# Patient Record
Sex: FEMALE | Race: WHITE | HISPANIC OR LATINO | Employment: FULL TIME
[De-identification: names, ages, dates, MRNs, and addresses within clinical notes are randomized per-mention and may not be internally consistent; named-entity substitution may affect disease eponyms.]

---

## 2022-01-22 ENCOUNTER — HEALTH MAINTENANCE LETTER (OUTPATIENT)
Age: 42
End: 2022-01-22

## 2022-01-26 ENCOUNTER — PRENATAL OFFICE VISIT (OUTPATIENT)
Dept: NURSING | Facility: CLINIC | Age: 42
End: 2022-01-26
Payer: COMMERCIAL

## 2022-01-26 DIAGNOSIS — Z34.80 PRENATAL CARE, SUBSEQUENT PREGNANCY, UNSPECIFIED TRIMESTER: Primary | ICD-10-CM

## 2022-01-26 PROBLEM — G89.29 CHRONIC NONINTRACTABLE HEADACHE: Status: ACTIVE | Noted: 2022-01-14

## 2022-01-26 PROBLEM — R51.9 CHRONIC NONINTRACTABLE HEADACHE: Status: ACTIVE | Noted: 2022-01-14

## 2022-01-26 PROBLEM — N93.9 ABNORMAL UTERINE BLEEDING (AUB): Status: ACTIVE | Noted: 2022-01-14

## 2022-01-26 PROCEDURE — 99207 PR NO CHARGE NURSE ONLY: CPT

## 2022-01-26 RX ORDER — VITAMIN A ACETATE, .BETA.-CAROTENE, ASCORBIC ACID, CHOLECALCIFEROL, .ALPHA.-TOCOPHEROL ACETATE, DL-, THIAMINE MONONITRATE, RIBOFLAVIN, NIACINAMIDE, PYRIDOXINE HYDROCHLORIDE, FOLIC ACID, CYANOCOBALAMIN, CALCIUM CARBONATE, FERROUS FUMARATE, ZINC OXIDE, AND CUPRIC OXIDE 2000; 2000; 120; 400; 22; 1.84; 3; 20; 10; 1; 12; 200; 27; 25; 2 [IU]/1; [IU]/1; MG/1; [IU]/1; MG/1; MG/1; MG/1; MG/1; MG/1; MG/1; UG/1; MG/1; MG/1; MG/1; MG/1
1 TABLET ORAL DAILY
COMMUNITY
End: 2022-03-02

## 2022-01-26 NOTE — PROGRESS NOTES
NPN nurse visit done over the phone. Pt will be given NPN folder and book at her upcoming appt.   Discussed optional screening available to assess chromosomal anomalies. Questions answered. Pt advised to call the clinic if she has any questions or concerns related to her pregnancy. Prenatal labs will be obtained at her upcoming appt. New prenatal visit scheduled on 2/7/22 with Dr Soliz.    14w3d    No results found for: PAP        Patient supplied answers from flow sheet for:  Prenatal OB Questionnaire.  Past Medical History  Have you ever recieved care for your mental health? : No  Have you ever been in a major accident or suffered serious trauma?: No  Within the last year, has anyone hit, slapped, kicked or otherwise hurt you?: No  In the last year, has anyone forced you to have sex when you didn't want to?: No    Past Medical History 2   Have you ever received a blood transfusion?: No  Would you accept a blood transfusion if was medically recommended?: Yes  Does anyone in your home smoke?: No   Is your blood type Rh negative?: No  Have you ever ?: (!) Yes  Have you been hospitalized for a nonsurgical reason excluding normal delivery?: No  Have you ever had an abnormal pap smear?: No    Past Medical History (Continued)  Do you have a history of abnormalities of the uterus?: No  Did your mother take ADA or any other hormones when she was pregnant with you?: No  Do you have any other problems we have not asked about which you feel may be important to this pregnancy?: No

## 2022-02-01 ENCOUNTER — LAB (OUTPATIENT)
Dept: LAB | Facility: CLINIC | Age: 42
End: 2022-02-01

## 2022-02-01 ENCOUNTER — ANCILLARY PROCEDURE (OUTPATIENT)
Dept: ULTRASOUND IMAGING | Facility: CLINIC | Age: 42
End: 2022-02-01
Payer: COMMERCIAL

## 2022-02-01 DIAGNOSIS — Z34.80 PRENATAL CARE, SUBSEQUENT PREGNANCY, UNSPECIFIED TRIMESTER: ICD-10-CM

## 2022-02-01 LAB
ABO/RH(D): NORMAL
ANTIBODY SCREEN: NEGATIVE
ERYTHROCYTE [DISTWIDTH] IN BLOOD BY AUTOMATED COUNT: 13.7 % (ref 10–15)
HCT VFR BLD AUTO: 32.7 % (ref 35–47)
HGB BLD-MCNC: 10.9 G/DL (ref 11.7–15.7)
MCH RBC QN AUTO: 28.8 PG (ref 26.5–33)
MCHC RBC AUTO-ENTMCNC: 33.3 G/DL (ref 31.5–36.5)
MCV RBC AUTO: 86 FL (ref 78–100)
PLATELET # BLD AUTO: 109 10E3/UL (ref 150–450)
RBC # BLD AUTO: 3.79 10E6/UL (ref 3.8–5.2)
SPECIMEN EXPIRATION DATE: NORMAL
WBC # BLD AUTO: 5.9 10E3/UL (ref 4–11)

## 2022-02-01 PROCEDURE — 86803 HEPATITIS C AB TEST: CPT

## 2022-02-01 PROCEDURE — 86850 RBC ANTIBODY SCREEN: CPT

## 2022-02-01 PROCEDURE — 87086 URINE CULTURE/COLONY COUNT: CPT

## 2022-02-01 PROCEDURE — 86901 BLOOD TYPING SEROLOGIC RH(D): CPT

## 2022-02-01 PROCEDURE — 86900 BLOOD TYPING SEROLOGIC ABO: CPT

## 2022-02-01 PROCEDURE — 87389 HIV-1 AG W/HIV-1&-2 AB AG IA: CPT

## 2022-02-01 PROCEDURE — 86762 RUBELLA ANTIBODY: CPT

## 2022-02-01 PROCEDURE — 36415 COLL VENOUS BLD VENIPUNCTURE: CPT

## 2022-02-01 PROCEDURE — 85027 COMPLETE CBC AUTOMATED: CPT

## 2022-02-01 PROCEDURE — 87340 HEPATITIS B SURFACE AG IA: CPT

## 2022-02-01 PROCEDURE — 86780 TREPONEMA PALLIDUM: CPT

## 2022-02-01 PROCEDURE — 76805 OB US >/= 14 WKS SNGL FETUS: CPT | Performed by: OBSTETRICS & GYNECOLOGY

## 2022-02-02 LAB
BACTERIA UR CULT: NO GROWTH
HBV SURFACE AG SERPL QL IA: NONREACTIVE
HCV AB SERPL QL IA: NONREACTIVE
HIV 1+2 AB+HIV1 P24 AG SERPL QL IA: NONREACTIVE
RUBV IGG SERPL QL IA: 26.4 INDEX
RUBV IGG SERPL QL IA: POSITIVE
T PALLIDUM AB SER QL: NONREACTIVE

## 2022-02-04 DIAGNOSIS — O09.529 SUPERVISION OF HIGH-RISK PREGNANCY OF ELDERLY MULTIGRAVIDA: Primary | ICD-10-CM

## 2022-02-04 NOTE — RESULT ENCOUNTER NOTE
Please call with normal prenatal labs apart from mildly low hgb and low platelets. I'd like her to obtain a repeat CBC in 1 week with ferritin and TIBC. If persistent low plt, will plan hematology referral for evaluation, though it may be a spuriously low result due to type of tubing used for draw or another lab error, which is why a repeat is helpful.    Natalie Soliz MD

## 2022-02-07 ENCOUNTER — TRANSCRIBE ORDERS (OUTPATIENT)
Dept: MATERNAL FETAL MEDICINE | Facility: CLINIC | Age: 42
End: 2022-02-07

## 2022-02-07 ENCOUNTER — PRENATAL OFFICE VISIT (OUTPATIENT)
Dept: OBGYN | Facility: CLINIC | Age: 42
End: 2022-02-07
Payer: COMMERCIAL

## 2022-02-07 VITALS
HEIGHT: 61 IN | WEIGHT: 151 LBS | DIASTOLIC BLOOD PRESSURE: 50 MMHG | BODY MASS INDEX: 28.51 KG/M2 | SYSTOLIC BLOOD PRESSURE: 100 MMHG

## 2022-02-07 DIAGNOSIS — Z12.4 SCREENING FOR CERVICAL CANCER: ICD-10-CM

## 2022-02-07 DIAGNOSIS — O09.529 SUPERVISION OF HIGH-RISK PREGNANCY OF ELDERLY MULTIGRAVIDA: ICD-10-CM

## 2022-02-07 DIAGNOSIS — Z11.3 ROUTINE SCREENING FOR STI (SEXUALLY TRANSMITTED INFECTION): ICD-10-CM

## 2022-02-07 DIAGNOSIS — O26.90 PREGNANCY RELATED CONDITION: Primary | ICD-10-CM

## 2022-02-07 DIAGNOSIS — O09.522 MULTIGRAVIDA OF ADVANCED MATERNAL AGE IN SECOND TRIMESTER: Primary | ICD-10-CM

## 2022-02-07 LAB
ERYTHROCYTE [DISTWIDTH] IN BLOOD BY AUTOMATED COUNT: 13.9 % (ref 10–15)
HCT VFR BLD AUTO: 35 % (ref 35–47)
HGB BLD-MCNC: 11.5 G/DL (ref 11.7–15.7)
MCH RBC QN AUTO: 28.7 PG (ref 26.5–33)
MCHC RBC AUTO-ENTMCNC: 32.9 G/DL (ref 31.5–36.5)
MCV RBC AUTO: 87 FL (ref 78–100)
PLATELET # BLD AUTO: 138 10E3/UL (ref 150–450)
RBC # BLD AUTO: 4.01 10E6/UL (ref 3.8–5.2)
WBC # BLD AUTO: 7.1 10E3/UL (ref 4–11)

## 2022-02-07 PROCEDURE — 87624 HPV HI-RISK TYP POOLED RSLT: CPT | Performed by: OBSTETRICS & GYNECOLOGY

## 2022-02-07 PROCEDURE — 87491 CHLMYD TRACH DNA AMP PROBE: CPT | Performed by: OBSTETRICS & GYNECOLOGY

## 2022-02-07 PROCEDURE — 99207 PR FIRST OB VISIT: CPT | Performed by: OBSTETRICS & GYNECOLOGY

## 2022-02-07 PROCEDURE — 82728 ASSAY OF FERRITIN: CPT

## 2022-02-07 PROCEDURE — G0145 SCR C/V CYTO,THINLAYER,RESCR: HCPCS | Performed by: OBSTETRICS & GYNECOLOGY

## 2022-02-07 PROCEDURE — 36415 COLL VENOUS BLD VENIPUNCTURE: CPT | Performed by: OBSTETRICS & GYNECOLOGY

## 2022-02-07 PROCEDURE — 87591 N.GONORRHOEAE DNA AMP PROB: CPT | Performed by: OBSTETRICS & GYNECOLOGY

## 2022-02-07 PROCEDURE — 85027 COMPLETE CBC AUTOMATED: CPT | Performed by: OBSTETRICS & GYNECOLOGY

## 2022-02-07 PROCEDURE — 83550 IRON BINDING TEST: CPT

## 2022-02-07 RX ORDER — PNV NO.95/FERROUS FUM/FOLIC AC 28MG-0.8MG
1 TABLET ORAL DAILY
Qty: 90 TABLET | Refills: 3 | Status: SHIPPED | OUTPATIENT
Start: 2022-02-07

## 2022-02-07 ASSESSMENT — MIFFLIN-ST. JEOR: SCORE: 1293.34

## 2022-02-07 NOTE — NURSING NOTE
"Chief Complaint   Patient presents with     Prenatal Care     NPN Provider visit   16w1d  Discuss lab results    initial /50   Ht 1.543 m (5' 0.75\")   Wt 68.5 kg (151 lb)   LMP 10/17/2021   BMI 28.77 kg/m   Estimated body mass index is 28.77 kg/m  as calculated from the following:    Height as of this encounter: 1.543 m (5' 0.75\").    Weight as of this encounter: 68.5 kg (151 lb).  BP completed using cuff size regular.  Fadia Carroll CMA    "

## 2022-02-07 NOTE — PROGRESS NOTES
New OB Visit  Allison Henao   2022   16w1d     Subjective: Allison Henao 41 year old  at 16w1d dated by LMP, midtrimester ultrasound here today for initial OB visit. Estimated Date of Delivery: 2022 Patient reports No Problems. Did has some mild cramps and scant vaginal spotting.     Faroese speaking patient, seen with  present virtually.    Gyn History:   Menses: LMP: Patient's last menstrual period was 10/17/2021. irregular, was using natural family planning  Sexually transmitted disease history: none.    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   H/o Chicken Pox or Varicella Vaccination: Yes  Covid Vax: x2, needs booster    History of GDM: No  Hx PTL : No   History of HTN in pregnancy: No   Shoulder dystocia: No   Vacuum Extraction: No  PPH: No   3rd of 4th degree laceration: No. She did have an episiotomy with the first.   Other complications: No    Since her last LMP she denies use of alcohol, tobacco and street drugs.    OBhx:  x 2  OB History    Para Term  AB Living   4 2 2 0 1 2   SAB IAB Ectopic Multiple Live Births   1 0 0 0 2      # Outcome Date GA Lbr John/2nd Weight Sex Delivery Anes PTL Lv   4 Current            3 SAB 2017           2 Term  38w0d   M Vag-Spont   ROMINA      Name: Rajesh   1 Term  40w0d   F Vag-Spont   ROMINA      Name: Yessy       ROS: Ten point review of systems was reviewed and negative except the above.    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, factory   Tobacco Use     Smoking status: Never Smoker     Smokeless tobacco: Never Used   Vaping Use     Vaping Use: Never used   Substance and Sexual Activity     Alcohol use: Not Currently     Drug use: Never     Sexual activity: Yes     Partners: Male   Other Topics Concern     None   Social History Narrative     None     Social Determinants of Health     Financial Resource Strain: Not on file   Food Insecurity: Not on file   Transportation Needs: Not on file   Physical Activity: Not on file   Stress: Not on file   Social Connections: Not on file   Intimate Partner Violence: Not on file   Housing Stability: Not on file     Current Outpatient Medications   Medication Sig     Prenatal Vit-Fe Fumarate-FA (PNV PRENATAL PLUS MULTIVITAMIN) 27-1 MG TABS per tablet Take 1 tablet by mouth daily     No current facility-administered medications for this visit.     No Known Allergies    Past medical, surgical, social and family history were reviewed and updated in EPIC.      EXAM:  /50   Ht 1.543 m (5' 0.75\")   Wt 68.5 kg (151 lb)   LMP 10/17/2021   BMI 28.77 kg/m       Gen:  no acute distress, comfortable  HENT: No scleral injection or icterus  CV: Regular rate and rhythm, no murmur  Resp: CTAB, Normal work of breathing, no cough  GI: Abdomen soft, non-tender. No masses, organomegaly  Skin: No suspicious lesions or rashes  Psychiatric: mentation appears normal and affect bright   Pelvis: External genitalia within normal limits. Urethra is without lesion. Bladder is nontender.    On speculum exam, cervix is without lesion and vagina is normal without lesion or discharge. Pap smear obtained without incident.   +      Recent Labs   Lab Test 02/01/22  1053   AS Negative   O+  Rhogam not indicated     Recent Labs   Lab Test 02/01/22  1053   HEPBANG Nonreactive   HIAGAB Nonreactive   RUQIGG Positive*       Treponemal antibody neg    CBC RESULTS:   Recent " Labs   Lab Test 22  1053   WBC 5.9   RBC 3.79*   HGB 10.9*   HCT 32.7*   MCV 86   MCH 28.8   MCHC 33.3   RDW 13.7   *       ASSESSMENT:  41 year old  at 16w1d dated by LMP and mid trimester US here for NOB visit.    PLAN:    1)Prenatal labs reviewed. She has no questions.  2) EDUCATION : RECOMMENDED WEIGHT GAIN: 15-25 lbs given Body mass index is 28.77 kg/m .   - Instructed on best evidence for: healthy diet and foods to avoid; exercise and activity during pregnancy; and maintenance of a generally healthy lifestyle. Reviewed early pregnancy education, provider coverage, labor and delivery, and prenatal visits.  Discussed the harms, benefits, side effects and alternative therapies for current prescribed and OTC medications.  - recommend PNV  3) Discussed options for screening for chromosomal anomalies, including first screen, noninvasive prenatal testing, CVS/amniocentesis, quad screen, and ultrasound at 18-20 weeks. She is electing ultrasound at 18-20 weeks. Undecided on genetic screening at this point.   4) AMA: level II,  testing at 36w    5) Thrombocytopenia: repeat CBC, if persistently low, plan Hematology referral.    5) Mild anemia: repeat hgb and iron studies, follow up as necessary    Follow up in 4 weeks. She is encouraged to call sooner with questions or concerns.    Natalie Soliz MD   Obstetrics and Gynecology

## 2022-02-08 ENCOUNTER — TELEPHONE (OUTPATIENT)
Dept: OBGYN | Facility: CLINIC | Age: 42
End: 2022-02-08
Payer: COMMERCIAL

## 2022-02-08 LAB
C TRACH DNA SPEC QL NAA+PROBE: NEGATIVE
FERRITIN SERPL-MCNC: 10 NG/ML (ref 12–150)
IRON SATN MFR SERPL: 13 % (ref 15–46)
IRON SERPL-MCNC: 65 UG/DL (ref 35–180)
N GONORRHOEA DNA SPEC QL NAA+PROBE: NEGATIVE
TIBC SERPL-MCNC: 512 UG/DL (ref 240–430)

## 2022-02-08 NOTE — TELEPHONE ENCOUNTER
M referral placed yesterday for first trimester screen with genetic counseling and US. Patient is too far along in pregnancy for first trimester screen.    [facility] is calling pt to offer a Comprehensive US and genetic screen.    China Fong CMA

## 2022-02-10 ENCOUNTER — TELEPHONE (OUTPATIENT)
Dept: OBGYN | Facility: CLINIC | Age: 42
End: 2022-02-10
Payer: COMMERCIAL

## 2022-02-10 NOTE — TELEPHONE ENCOUNTER
Form received from: Spouse- Juan Browndo     Form requesting following info/need: FMLA Forms for Spouse of pt from [facility]     MALCOLM needed?: NA     Location of form: Basket Above Lavonne's desk     When completed the route for return: Call pt's spouse to  form at [phone]

## 2022-02-11 LAB
BKR LAB AP GYN ADEQUACY: NORMAL
BKR LAB AP GYN INTERPRETATION: NORMAL
BKR LAB AP HPV REFLEX: NORMAL
BKR LAB AP PREVIOUS ABNORMAL: NORMAL
PATH REPORT.COMMENTS IMP SPEC: NORMAL
PATH REPORT.COMMENTS IMP SPEC: NORMAL
PATH REPORT.RELEVANT HX SPEC: NORMAL

## 2022-02-14 LAB
HUMAN PAPILLOMA VIRUS 16 DNA: NEGATIVE
HUMAN PAPILLOMA VIRUS 18 DNA: NEGATIVE
HUMAN PAPILLOMA VIRUS FINAL DIAGNOSIS: NORMAL
HUMAN PAPILLOMA VIRUS OTHER HR: NEGATIVE

## 2022-02-17 NOTE — TELEPHONE ENCOUNTER
Forms signed by AB, and patient notified, forms placed up front for pick-up.  Janelle Ramsey CMA

## 2022-02-17 NOTE — TELEPHONE ENCOUNTER
Dr. oSliz is in clinic today and will sign the forms. She has been out of the clinic and then very busy while in clinic.   Janelle Ramsey CMA

## 2022-02-22 ENCOUNTER — PRE VISIT (OUTPATIENT)
Dept: MATERNAL FETAL MEDICINE | Facility: CLINIC | Age: 42
End: 2022-02-22
Payer: COMMERCIAL

## 2022-03-01 ENCOUNTER — HOSPITAL ENCOUNTER (OUTPATIENT)
Dept: ULTRASOUND IMAGING | Facility: CLINIC | Age: 42
End: 2022-03-01
Attending: OBSTETRICS & GYNECOLOGY
Payer: COMMERCIAL

## 2022-03-01 ENCOUNTER — OFFICE VISIT (OUTPATIENT)
Dept: MATERNAL FETAL MEDICINE | Facility: CLINIC | Age: 42
End: 2022-03-01
Attending: OBSTETRICS & GYNECOLOGY
Payer: COMMERCIAL

## 2022-03-01 DIAGNOSIS — O09.522 AMA (ADVANCED MATERNAL AGE) MULTIGRAVIDA 35+, SECOND TRIMESTER: Primary | ICD-10-CM

## 2022-03-01 DIAGNOSIS — O26.90 PREGNANCY RELATED CONDITION: ICD-10-CM

## 2022-03-01 DIAGNOSIS — O43.119 CIRCUMVALLATE PLACENTA, UNSPECIFIED TRIMESTER: ICD-10-CM

## 2022-03-01 PROCEDURE — 76811 OB US DETAILED SNGL FETUS: CPT | Mod: 26 | Performed by: OBSTETRICS & GYNECOLOGY

## 2022-03-01 PROCEDURE — 76811 OB US DETAILED SNGL FETUS: CPT

## 2022-03-02 ENCOUNTER — PRENATAL OFFICE VISIT (OUTPATIENT)
Dept: OBGYN | Facility: CLINIC | Age: 42
End: 2022-03-02
Payer: COMMERCIAL

## 2022-03-02 VITALS — DIASTOLIC BLOOD PRESSURE: 54 MMHG | BODY MASS INDEX: 29.87 KG/M2 | SYSTOLIC BLOOD PRESSURE: 106 MMHG | WEIGHT: 156.8 LBS

## 2022-03-02 DIAGNOSIS — O09.529 SUPERVISION OF HIGH-RISK PREGNANCY OF ELDERLY MULTIGRAVIDA: Primary | ICD-10-CM

## 2022-03-02 DIAGNOSIS — N89.8 VAGINAL DISCHARGE: ICD-10-CM

## 2022-03-02 DIAGNOSIS — O43.119 ANTEPARTUM PLACENTA CIRCUMVALLATA: ICD-10-CM

## 2022-03-02 DIAGNOSIS — O99.019 ANTEPARTUM ANEMIA: ICD-10-CM

## 2022-03-02 LAB
CLUE CELLS: ABNORMAL
TRICHOMONAS, WET PREP: ABNORMAL
WBC'S/HIGH POWER FIELD, WET PREP: ABNORMAL
YEAST, WET PREP: ABNORMAL

## 2022-03-02 PROCEDURE — 99213 OFFICE O/P EST LOW 20 MIN: CPT | Performed by: OBSTETRICS & GYNECOLOGY

## 2022-03-02 PROCEDURE — 87210 SMEAR WET MOUNT SALINE/INK: CPT | Performed by: OBSTETRICS & GYNECOLOGY

## 2022-03-02 PROCEDURE — 99207 PR PRENATAL VISIT: CPT | Performed by: OBSTETRICS & GYNECOLOGY

## 2022-03-02 NOTE — NURSING NOTE
"Chief Complaint   Patient presents with     Prenatal Care     19 weeks 3 days, no c/o VB or cramping.      Vaginal Discharge     increased discharge and itching since .        Initial /54   Wt 71.1 kg (156 lb 12.8 oz)   LMP 10/17/2021   BMI 29.87 kg/m   Estimated body mass index is 29.87 kg/m  as calculated from the following:    Height as of 22: 1.543 m (5' 0.75\").    Weight as of this encounter: 71.1 kg (156 lb 12.8 oz).  BP completed using cuff size: regular    Questioned patient about current smoking habits.  Pt. has never smoked.          The following HM Due: NONE    Janelle Ramsey CMA                "

## 2022-03-02 NOTE — PROGRESS NOTES
IUP at 19w3d,    Late entry to prenatal care  +FM, no cramping or bleeding  Today reports white vaginal discharge and pruritis.     PE  /54   Wt 71.1 kg (156 lb 12.8 oz)   LMP 10/17/2021   BMI 29.87 kg/m     : External genitalia normal well-estrogenized, healthy tissue.  No obvious excoriations, lesions, or rashes. Bartholins, urethra, skeins normal.  Normal pink vaginal mucosa.  SSE: Normal cervix with scant thick, curd-like white discharge, pH 4.    1. Supervision of high-risk pregnancy of elderly multigravida  Level II within normal limits with the exception of circumvallate placenta, see below.    2. Antepartum anemia  - mild anemia, on PO iron x2w. Plan to repeat CBC in 2 week  - mild thrombocytopenia - if persistently low on repeat check, plan Heme consult    3. Antepartum placenta circumvallata  - serial growth US    4. Vaginal discharge  - hx pruritis w/new onset white discharge. Suspect yeast by hx and exam. Wet prep sent. Reviewed diflucan if indicated by wet prep. No cramping.     Return to clinic 4w for routine prenatal care.     Natalie Soliz MD

## 2022-03-15 DIAGNOSIS — D69.6 THROMBOCYTOPENIA AFFECTING PREGNANCY (H): Primary | ICD-10-CM

## 2022-03-15 DIAGNOSIS — O99.119 THROMBOCYTOPENIA AFFECTING PREGNANCY (H): Primary | ICD-10-CM

## 2022-03-16 ENCOUNTER — PATIENT OUTREACH (OUTPATIENT)
Dept: ONCOLOGY | Facility: CLINIC | Age: 42
End: 2022-03-16
Payer: COMMERCIAL

## 2022-03-17 NOTE — PROGRESS NOTES
RECORDS STATUS - ALL OTHER DIAGNOSIS      RECORDS RECEIVED FROM: [facility]   DATE RECEIVED: 3/28/2022   NOTES STATUS DETAILS   OFFICE NOTE from referring provider     DISCHARGE REPORT from the ER     MEDICATION LIST Complete [facility]   CLINICAL TRIAL TREATMENTS TO DATE     LABS     PATHOLOGY REPORTS     ANYTHING RELATED TO DIAGNOSIS COmplete Labs last updated on 3/10/2022   GENONOMIC TESTING     TYPE:     IMAGING (NEED IMAGES & REPORT)     CT SCANS     MRI     MAMMO     ULTRASOUND Complete US OB images are in PACS   PET       .

## 2022-03-27 NOTE — PROGRESS NOTES
[facility]    Hematology/Oncology New Patient Note      Today's Date: 3/28/22    Reason for Consultation: Thrombocytopenia affecting pregnancy (H)  Referring Provider: Natalie Soliz MD      HISTORY OF PRESENT ILLNESS: Allison Henao is a  42 year old female who is referred for anemia and thrombocytopenia in pregnancy. She is currently 23 weeks gestation. Past medical history is significant for ovarian cyst, prediabetes, HLD. She is Bangladeshi-speaking and history is obtained via virtual .    [place]. She has delivered her two children in [place] without CBC abnormality, both alive and well. Both were delivered vaginally without excessive bleed. [place] she has had one miscarriage in 2017 at 6 weeks gestation. She had vaginal bleeding for 10 days.    Since , WBC has ranged 5.9-8.4, hemoglobin has decreased from 12.8 to 10.7 (MCV 80s), platelets have ranged 109-178. On 22, ferritin 10, TIBC 512, iron sat 13%. HIV negative. Hep B surface antigen negative. She takes iron and prenatal vitamin.    No epistaxis, gingival bleed, petechiae, hematuria/hematochezia/melena, or vaginal bleed. LMP was 2021. Planned for vaginal delivery 22.    She denies malar rash. She has intermittent arthralgias, but in the shoulders. 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    REVIEW OF SYSTEMS:   A 14 point ROS was reviewed with pertinent positives and negatives in the HPI.        HOME MEDICATIONS:  Current Outpatient Medications   Medication Sig Dispense Refill     Prenatal Vit-Fe Fumarate-FA (PRENATAL VITAMIN) 27-0.8 MG TABS Take 1 tablet by mouth daily 90 tablet 3         ALLERGIES:  No Known Allergies      PAST MEDICAL HISTORY:  Past Medical History:   Diagnosis Date     Ovarian cyst          PAST SURGICAL HISTORY:  Past Surgical History:   Procedure Laterality Date     ovarian cystectomy  2018  "        SOCIAL HISTORY:  Social History     Socioeconomic History     Marital status:      Spouse name: Not on file     Number of children: Not on file     Years of education: Not on file     Highest education level: Not on file   Occupational History     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, factory   Tobacco Use     Smoking status: Never Smoker     Smokeless tobacco: Never Used   Vaping Use     Vaping Use: Never used   Substance and Sexual Activity     Alcohol use: Not Currently     Drug use: Never     Sexual activity: Yes     Partners: Male   Other Topics Concern     Not on file   Social History Narrative     Not on file     Social Determinants of Health     Financial Resource Strain: Not on file   Food Insecurity: Not on file   Transportation Needs: Not on file   Physical Activity: Not on file   Stress: Not on file   Social Connections: Not on file   Intimate Partner Violence: Not on file   Housing Stability: Not on file         FAMILY HISTORY:  Family History   Problem Relation Age of Onset     Thyroid Disease Mother      Pacemaker Mother      Pacemaker Father    Sister- DM2.  No rheumatologic disorders.       PHYSICAL EXAM:  Vital signs:  BP 96/60   Pulse 78   Temp 97.6  F (36.4  C) (Tympanic)   Resp 16   Ht 1.543 m (5' 0.75\")   Wt 73.5 kg (162 lb)   LMP 10/17/2021   SpO2 97%   BMI 30.86 kg/m       GENERAL/CONSTITUTIONAL: No acute distress.  EYES: Pupils are equal, round, and react to light and accommodation. Extraocular movements intact.  No scleral icterus.  ENT/MOUTH: Neck supple. Oropharynx clear, no mucositis.  LYMPH: No anterior cervical, posterior cervical, supraclavicular, axillary or inguinal adenopathy.   RESPIRATORY: Clear to auscultation bilaterally. No crackles or wheezing.   CARDIOVASCULAR: Regular rate and rhythm without murmurs, gallops, or rubs.  GASTROINTESTINAL: No hepatosplenomegaly, masses, or tenderness. The patient has normal bowel sounds. No guarding.  No distention. Gravid " abdomen.  MUSCULOSKELETAL: Warm and well-perfused, no cyanosis, clubbing, or edema.  NEUROLOGIC: Cranial nerves II-XII are intact. Alert, oriented, answers questions appropriately.  INTEGUMENTARY: No rashes or jaundice.  GAIT: Steady, does not use assistive device      LABS:  CBC RESULTS:   Recent Labs   Lab Test 03/10/22  1659   WBC 7.6   RBC 3.66*   HGB 10.7*   HCT 32.6*   MCV 89   MCH 29.2   MCHC 32.8   RDW 14.6   *       Recent Labs   Lab Test 09/28/15  1902      POTASSIUM 3.6   CHLORIDE 105   CO2 24   ANIONGAP 8   GLC 96   BUN 10   CR 0.52   DENITA 8.7     [facility] BLOOD  [facility]  2020    Component 2020         WBC 5.7 4.9 5.7   RBC 4.43 4.62 4.59   Hemoglobin 12.4 12.8 12.8   HCT 37.4 39.2 38.8   MCV 84.4 84.8 84.5   MCH 28.0 27.7 27.9   MCHC 33.2 32.7 33.0   RDW 13.2 13.3 12.9   Platelets 157 153 137 Low       PMN/Band -- -- --   Lymph -- -- --   Mono -- -- --   Eos -- -- --   Baso -- -- --   Neutrophil Absolute 3.4 3.0 4.3   Lymph Absolute -- -- --   Mono  Absolute -- -- --   Eos   Absolute -- -- --   Baso  Absolute -- -- --   MPV -- -- --   Lymphocyte Absolute 1.8 1.4 1.0   Monocytes Absolute 0.4 0.3 0.3   Eosinophil Absolute 0.1 0.1 0.1   Basophil Absolute 0.0 0.0 0.0   Immature Gran % 0.0 0.0 0.0         PATHOLOGY:  N/A    IMAGING:  N/A    ASSESSMENT/PLAN:  Allison Henao is a  42 year old female who is referred for anemia and thrombocytopenia in pregnancy. She is currently 23 weeks gestation. Past medical history is significant for ovarian cyst, prediabetes, HLD. She is Sami-speaking and history is obtained via [facility] . Due date is 22.    Discussed with patient her past medical history, previous pregnancies, and current laboratory findings. She has evidence of iron deficiency anemia due to pregnancy and will supplement with venofer 300 mg weekly x3 weeks. She should continue daily iron, VitC, and prenatal vitamin.     For  thrombocytopenia, differential includes gestational thrombocytopenia as platelets continue to be >100K. However, we discussed the possibility of ITP and need to monitor for HELLP/DIC. We discussed the possibility of steroid pulse or IVIG in the future.     Check CBC, CMP, coag studies, LDH, haptoglobin, folate, B12, LEEANN, and peripheral smear. She will require weekly labs for monitoring.     Thank you referring Mrs. Henao to clinic. Adequate time was dedicated to patient questions and answered to the expressed satisfaction of the patient.    Complexity: HIGH.      Zari Roque,   Hematology/Oncology  [facility]

## 2022-03-28 ENCOUNTER — ONCOLOGY VISIT (OUTPATIENT)
Dept: ONCOLOGY | Facility: CLINIC | Age: 42
End: 2022-03-28
Attending: OBSTETRICS & GYNECOLOGY
Payer: COMMERCIAL

## 2022-03-28 ENCOUNTER — PRE VISIT (OUTPATIENT)
Dept: ONCOLOGY | Facility: CLINIC | Age: 42
End: 2022-03-28

## 2022-03-28 VITALS
DIASTOLIC BLOOD PRESSURE: 60 MMHG | HEART RATE: 78 BPM | BODY MASS INDEX: 30.58 KG/M2 | RESPIRATION RATE: 16 BRPM | HEIGHT: 61 IN | WEIGHT: 162 LBS | TEMPERATURE: 97.6 F | OXYGEN SATURATION: 97 % | SYSTOLIC BLOOD PRESSURE: 96 MMHG

## 2022-03-28 DIAGNOSIS — D69.6 THROMBOCYTOPENIA AFFECTING PREGNANCY (H): ICD-10-CM

## 2022-03-28 DIAGNOSIS — D50.0 IRON DEFICIENCY ANEMIA DUE TO CHRONIC BLOOD LOSS: ICD-10-CM

## 2022-03-28 DIAGNOSIS — D69.6 THROMBOCYTOPENIA AFFECTING PREGNANCY (H): Primary | ICD-10-CM

## 2022-03-28 DIAGNOSIS — O99.119 THROMBOCYTOPENIA AFFECTING PREGNANCY (H): ICD-10-CM

## 2022-03-28 DIAGNOSIS — O99.119 THROMBOCYTOPENIA AFFECTING PREGNANCY (H): Primary | ICD-10-CM

## 2022-03-28 LAB
ALBUMIN SERPL-MCNC: 2.7 G/DL (ref 3.4–5)
ALP SERPL-CCNC: 86 U/L (ref 40–150)
ALT SERPL W P-5'-P-CCNC: 27 U/L (ref 0–50)
ANION GAP SERPL CALCULATED.3IONS-SCNC: 4 MMOL/L (ref 3–14)
APTT PPP: 39 SECONDS (ref 22–38)
AST SERPL W P-5'-P-CCNC: 20 U/L (ref 0–45)
BASOPHILS # BLD AUTO: 0 10E3/UL (ref 0–0.2)
BASOPHILS NFR BLD AUTO: 0 %
BILIRUB SERPL-MCNC: 0.8 MG/DL (ref 0.2–1.3)
BUN SERPL-MCNC: 9 MG/DL (ref 7–30)
CALCIUM SERPL-MCNC: 8.7 MG/DL (ref 8.5–10.1)
CHLORIDE BLD-SCNC: 109 MMOL/L (ref 94–109)
CO2 SERPL-SCNC: 24 MMOL/L (ref 20–32)
CREAT SERPL-MCNC: 0.41 MG/DL (ref 0.52–1.04)
EOSINOPHIL # BLD AUTO: 0 10E3/UL (ref 0–0.7)
EOSINOPHIL NFR BLD AUTO: 0 %
ERYTHROCYTE [DISTWIDTH] IN BLOOD BY AUTOMATED COUNT: 14.5 % (ref 10–15)
FIBRINOGEN PPP-MCNC: 434 MG/DL (ref 170–490)
FOLATE SERPL-MCNC: 36.1 NG/ML
GFR SERPL CREATININE-BSD FRML MDRD: >90 ML/MIN/1.73M2
GLUCOSE BLD-MCNC: 88 MG/DL (ref 70–99)
HCT VFR BLD AUTO: 37.8 % (ref 35–47)
HGB BLD-MCNC: 12.4 G/DL (ref 11.7–15.7)
IMM GRANULOCYTES # BLD: 0 10E3/UL
IMM GRANULOCYTES NFR BLD: 0 %
INR PPP: 1 (ref 0.85–1.15)
LDH SERPL L TO P-CCNC: 158 U/L (ref 81–234)
LYMPHOCYTES # BLD AUTO: 1.2 10E3/UL (ref 0.8–5.3)
LYMPHOCYTES NFR BLD AUTO: 16 %
MCH RBC QN AUTO: 29.4 PG (ref 26.5–33)
MCHC RBC AUTO-ENTMCNC: 32.8 G/DL (ref 31.5–36.5)
MCV RBC AUTO: 90 FL (ref 78–100)
MONOCYTES # BLD AUTO: 0.4 10E3/UL (ref 0–1.3)
MONOCYTES NFR BLD AUTO: 6 %
NEUTROPHILS # BLD AUTO: 5.7 10E3/UL (ref 1.6–8.3)
NEUTROPHILS NFR BLD AUTO: 78 %
NRBC # BLD AUTO: 0 10E3/UL
NRBC BLD AUTO-RTO: 0 /100
PLATELET # BLD AUTO: 147 10E3/UL (ref 150–450)
POTASSIUM BLD-SCNC: 3.6 MMOL/L (ref 3.4–5.3)
PROT SERPL-MCNC: 7.1 G/DL (ref 6.8–8.8)
RBC # BLD AUTO: 4.22 10E6/UL (ref 3.8–5.2)
RETICS # AUTO: 0.12 10E6/UL (ref 0.03–0.1)
RETICS/RBC NFR AUTO: 2.9 % (ref 0.5–2)
SODIUM SERPL-SCNC: 137 MMOL/L (ref 133–144)
VIT B12 SERPL-MCNC: 227 PG/ML (ref 193–986)
WBC # BLD AUTO: 7.3 10E3/UL (ref 4–11)

## 2022-03-28 PROCEDURE — 82607 VITAMIN B-12: CPT | Performed by: INTERNAL MEDICINE

## 2022-03-28 PROCEDURE — 83010 ASSAY OF HAPTOGLOBIN QUANT: CPT | Performed by: INTERNAL MEDICINE

## 2022-03-28 PROCEDURE — 85730 THROMBOPLASTIN TIME PARTIAL: CPT | Performed by: INTERNAL MEDICINE

## 2022-03-28 PROCEDURE — 36415 COLL VENOUS BLD VENIPUNCTURE: CPT | Performed by: INTERNAL MEDICINE

## 2022-03-28 PROCEDURE — 82746 ASSAY OF FOLIC ACID SERUM: CPT | Performed by: INTERNAL MEDICINE

## 2022-03-28 PROCEDURE — 99205 OFFICE O/P NEW HI 60 MIN: CPT | Performed by: INTERNAL MEDICINE

## 2022-03-28 PROCEDURE — 86038 ANTINUCLEAR ANTIBODIES: CPT | Performed by: INTERNAL MEDICINE

## 2022-03-28 PROCEDURE — 85025 COMPLETE CBC W/AUTO DIFF WBC: CPT | Performed by: INTERNAL MEDICINE

## 2022-03-28 PROCEDURE — 80053 COMPREHEN METABOLIC PANEL: CPT | Performed by: INTERNAL MEDICINE

## 2022-03-28 PROCEDURE — G0463 HOSPITAL OUTPT CLINIC VISIT: HCPCS

## 2022-03-28 PROCEDURE — 85045 AUTOMATED RETICULOCYTE COUNT: CPT | Performed by: INTERNAL MEDICINE

## 2022-03-28 PROCEDURE — 83615 LACTATE (LD) (LDH) ENZYME: CPT | Performed by: INTERNAL MEDICINE

## 2022-03-28 PROCEDURE — 85610 PROTHROMBIN TIME: CPT | Performed by: INTERNAL MEDICINE

## 2022-03-28 PROCEDURE — 85384 FIBRINOGEN ACTIVITY: CPT | Performed by: INTERNAL MEDICINE

## 2022-03-28 RX ORDER — DIPHENHYDRAMINE HYDROCHLORIDE 50 MG/ML
50 INJECTION INTRAMUSCULAR; INTRAVENOUS
Status: CANCELLED
Start: 2022-03-30

## 2022-03-28 RX ORDER — ALBUTEROL SULFATE 0.83 MG/ML
2.5 SOLUTION RESPIRATORY (INHALATION)
Status: CANCELLED | OUTPATIENT
Start: 2022-03-30

## 2022-03-28 RX ORDER — HEPARIN SODIUM,PORCINE 10 UNIT/ML
5 VIAL (ML) INTRAVENOUS
Status: CANCELLED | OUTPATIENT
Start: 2022-03-30

## 2022-03-28 RX ORDER — HEPARIN SODIUM (PORCINE) LOCK FLUSH IV SOLN 100 UNIT/ML 100 UNIT/ML
5 SOLUTION INTRAVENOUS
Status: CANCELLED | OUTPATIENT
Start: 2022-03-30

## 2022-03-28 RX ORDER — ALBUTEROL SULFATE 90 UG/1
1-2 AEROSOL, METERED RESPIRATORY (INHALATION)
Status: CANCELLED
Start: 2022-03-30

## 2022-03-28 RX ORDER — METHYLPREDNISOLONE SODIUM SUCCINATE 125 MG/2ML
125 INJECTION, POWDER, LYOPHILIZED, FOR SOLUTION INTRAMUSCULAR; INTRAVENOUS
Status: CANCELLED
Start: 2022-03-30

## 2022-03-28 RX ORDER — MEPERIDINE HYDROCHLORIDE 25 MG/ML
25 INJECTION INTRAMUSCULAR; INTRAVENOUS; SUBCUTANEOUS EVERY 30 MIN PRN
Status: CANCELLED | OUTPATIENT
Start: 2022-03-30

## 2022-03-28 RX ORDER — EPINEPHRINE 1 MG/ML
0.3 INJECTION, SOLUTION INTRAMUSCULAR; SUBCUTANEOUS EVERY 5 MIN PRN
Status: CANCELLED | OUTPATIENT
Start: 2022-03-30

## 2022-03-28 RX ORDER — NALOXONE HYDROCHLORIDE 0.4 MG/ML
0.2 INJECTION, SOLUTION INTRAMUSCULAR; INTRAVENOUS; SUBCUTANEOUS
Status: CANCELLED | OUTPATIENT
Start: 2022-03-30

## 2022-03-28 RX ORDER — FERROUS SULFATE 325(65) MG
325 TABLET, DELAYED RELEASE (ENTERIC COATED) ORAL 2 TIMES DAILY
COMMUNITY
End: 2022-05-10 | Stop reason: ALTCHOICE

## 2022-03-28 ASSESSMENT — PAIN SCALES - GENERAL: PAINLEVEL: MILD PAIN (2)

## 2022-03-28 NOTE — NURSING NOTE
"Oncology Rooming Note    March 28, 2022 9:04 AM   Allison Henao is a 42 year old female who presents for:    Chief Complaint   Patient presents with     Oncology Clinic Visit     new patient     Initial Vitals: BP 96/60   Pulse 78   Temp 97.6  F (36.4  C) (Tympanic)   Resp 16   Ht 1.543 m (5' 0.75\")   Wt 73.5 kg (162 lb)   LMP 10/17/2021   SpO2 97%   BMI 30.86 kg/m   Estimated body mass index is 30.86 kg/m  as calculated from the following:    Height as of this encounter: 1.543 m (5' 0.75\").    Weight as of this encounter: 73.5 kg (162 lb). Body surface area is 1.77 meters squared.  Mild Pain (2) Comment: Data Unavailable   Patient's last menstrual period was 10/17/2021.  Allergies reviewed: Yes  Medications reviewed: Yes    Medications: Medication refills not needed today.  Pharmacy name entered into [employer]: [id number] - [facility] [id number] AT [place] [address]    Clinical concerns: new patient       Sana Busch CMA              "

## 2022-03-28 NOTE — PROGRESS NOTES
Medical Assistant Note:  Allison Henao presents today for blood draw.    Patient seen by provider today: Yes: Dr. Roque.   present during visit today: Not Applicable.    Concerns: No Concerns.    Procedure:  Labs drawn    Post Assessment:  Labs drawn without difficulty: Yes.    Discharge Plan:  Departure Mode: Ambulatory.    Face to Face Time: 10 min.    Renetta Calzada CMA

## 2022-03-28 NOTE — LETTER
3/28/2022         RE: Allison Henao  [address] [facility] [address]        Dear Colleague,    Thank you for referring your patient, Allison Henao, to the [facility] [place]. Please see a copy of my visit note below.    [facility]    Hematology/Oncology New Patient Note      Today's Date: 3/28/22    Reason for Consultation: Thrombocytopenia affecting pregnancy (H)  Referring Provider: Natalie Soliz MD      HISTORY OF PRESENT ILLNESS: Allison Henao is a  42 year old female who is referred for anemia and thrombocytopenia in pregnancy. She is currently 23 weeks gestation. Past medical history is significant for ovarian cyst, prediabetes, HLD. She is British-speaking and history is obtained via virtual .    She is originally from [facility] and [place] the [place] in [place]. She has delivered her two children in [facility] without CBC abnormality, both alive and well. Both were delivered vaginally without excessive bleed. [place] has had one miscarriage in 2017 at 6 weeks gestation. She had vaginal bleeding for 10 days.    Since , WBC has ranged 5.9-8.4, hemoglobin has decreased from 12.8 to 10.7 (MCV 80s), platelets have ranged 109-178. On 22, ferritin 10, TIBC 512, iron sat 13%. HIV negative. Hep B surface antigen negative. She takes iron and prenatal vitamin.    No epistaxis, gingival bleed, petechiae, hematuria/hematochezia/melena, or vaginal bleed. LMP was 2021. Planned for vaginal delivery 22.    She denies malar rash. She has intermittent arthralgias, but in the shoulders. 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    REVIEW OF SYSTEMS:   A 14 point ROS was reviewed with pertinent positives and negatives in the HPI.        HOME MEDICATIONS:  Current Outpatient Medications   Medication Sig Dispense Refill     Prenatal Vit-Fe Fumarate-FA (PRENATAL VITAMIN) 27-0.8 MG TABS Take 1 tablet by  "mouth daily 90 tablet 3         ALLERGIES:  No Known Allergies      PAST MEDICAL HISTORY:  Past Medical History:   Diagnosis Date     Ovarian cyst          PAST SURGICAL HISTORY:  Past Surgical History:   Procedure Laterality Date     ovarian cystectomy  09/21/2018         SOCIAL HISTORY:  Social History     Socioeconomic History     Marital status:      Spouse name: Not on file     Number of children: Not on file     Years of education: Not on file     Highest education level: Not on file   Occupational History     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, factory   Tobacco Use     Smoking status: Never Smoker     Smokeless tobacco: Never Used   Vaping Use     Vaping Use: Never used   Substance and Sexual Activity     Alcohol use: Not Currently     Drug use: Never     Sexual activity: Yes     Partners: Male   Other Topics Concern     Not on file   Social History Narrative     Not on file     Social Determinants of Health     Financial Resource Strain: Not on file   Food Insecurity: Not on file   Transportation Needs: Not on file   Physical Activity: Not on file   Stress: Not on file   Social Connections: Not on file   Intimate Partner Violence: Not on file   Housing Stability: Not on file         FAMILY HISTORY:  Family History   Problem Relation Age of Onset     Thyroid Disease Mother      Pacemaker Mother      Pacemaker Father    Sister- DM2.  No rheumatologic disorders.       PHYSICAL EXAM:  Vital signs:  BP 96/60   Pulse 78   Temp 97.6  F (36.4  C) (Tympanic)   Resp 16   Ht 1.543 m (5' 0.75\")   Wt 73.5 kg (162 lb)   LMP 10/17/2021   SpO2 97%   BMI 30.86 kg/m       GENERAL/CONSTITUTIONAL: No acute distress.  EYES: Pupils are equal, round, and react to light and accommodation. Extraocular movements intact.  No scleral icterus.  ENT/MOUTH: Neck supple. Oropharynx clear, no mucositis.  LYMPH: No anterior cervical, posterior cervical, supraclavicular, axillary or inguinal adenopathy.   RESPIRATORY: Clear to " auscultation bilaterally. No crackles or wheezing.   CARDIOVASCULAR: Regular rate and rhythm without murmurs, gallops, or rubs.  GASTROINTESTINAL: No hepatosplenomegaly, masses, or tenderness. The patient has normal bowel sounds. No guarding.  No distention. Gravid abdomen.  MUSCULOSKELETAL: Warm and well-perfused, no cyanosis, clubbing, or edema.  NEUROLOGIC: Cranial nerves II-XII are intact. Alert, oriented, answers questions appropriately.  INTEGUMENTARY: No rashes or jaundice.  GAIT: Steady, does not use assistive device      LABS:  CBC RESULTS:   Recent Labs   Lab Test 03/10/22  1659   WBC 7.6   RBC 3.66*   HGB 10.7*   HCT 32.6*   MCV 89   MCH 29.2   MCHC 32.8   RDW 14.6   *       Recent Labs   Lab Test 09/28/15  1902      POTASSIUM 3.6   CHLORIDE 105   CO2 24   ANIONGAP 8   GLC 96   BUN 10   CR 0.52   DENITA 8.7     [facility] BLOOD  [facility]  2020    Component 2020         WBC 5.7 4.9 5.7   RBC 4.43 4.62 4.59   Hemoglobin 12.4 12.8 12.8   HCT 37.4 39.2 38.8   MCV 84.4 84.8 84.5   MCH 28.0 27.7 27.9   MCHC 33.2 32.7 33.0   RDW 13.2 13.3 12.9   Platelets 157 153 137 Low       PMN/Band -- -- --   Lymph -- -- --   Mono -- -- --   Eos -- -- --   Baso -- -- --   Neutrophil Absolute 3.4 3.0 4.3   Lymph Absolute -- -- --   Mono  Absolute -- -- --   Eos   Absolute -- -- --   Baso  Absolute -- -- --   MPV -- -- --   Lymphocyte Absolute 1.8 1.4 1.0   Monocytes Absolute 0.4 0.3 0.3   Eosinophil Absolute 0.1 0.1 0.1   Basophil Absolute 0.0 0.0 0.0   Immature Gran % 0.0 0.0 0.0         PATHOLOGY:  N/A    IMAGING:  N/A    ASSESSMENT/PLAN:  Allison Henao is a  42 year old female who is referred for anemia and thrombocytopenia in pregnancy. She is currently 23 weeks gestation. Past medical history is significant for ovarian cyst, prediabetes, HLD. She is Swedish-speaking and history is obtained via virtual . Due date is 22.    Discussed with patient her past  medical history, previous pregnancies, and current laboratory findings. She has evidence of iron deficiency anemia due to pregnancy and will supplement with venofer 300 mg weekly x3 weeks. She should continue daily iron, VitC, and prenatal vitamin.     For thrombocytopenia, differential includes gestational thrombocytopenia as platelets continue to be >100K. However, we discussed the possibility of ITP and need to monitor for HELLP/DIC. We discussed the possibility of steroid pulse or IVIG in the future.     Check CBC, CMP, coag studies, LDH, haptoglobin, folate, B12, LEEANN, and peripheral smear. She will require weekly labs for monitoring.     Thank you referring Mrs. Henao to clinic. Adequate time was dedicated to patient questions and answered to the expressed satisfaction of the patient.    Complexity: HIGH.      Zari Roque DO  Hematology/Oncology  [facility]        Again, thank you for allowing me to participate in the care of your patient.        Sincerely,        Zari Roque DO

## 2022-03-28 NOTE — PATIENT INSTRUCTIONS
Allison is scheduled for Venofer on 03/31/22, 04/07/22, 04/14/22 and a return with Dr. Roque on 04/06/22 at 11:30 am.    Dot Villanueva RN on 3/28/2022 at 2:24 PM

## 2022-03-29 ENCOUNTER — PATIENT OUTREACH (OUTPATIENT)
Dept: ONCOLOGY | Facility: CLINIC | Age: 42
End: 2022-03-29
Payer: COMMERCIAL

## 2022-03-29 ENCOUNTER — PRENATAL OFFICE VISIT (OUTPATIENT)
Dept: OBGYN | Facility: CLINIC | Age: 42
End: 2022-03-29
Payer: COMMERCIAL

## 2022-03-29 ENCOUNTER — TELEPHONE (OUTPATIENT)
Dept: ONCOLOGY | Facility: CLINIC | Age: 42
End: 2022-03-29
Payer: COMMERCIAL

## 2022-03-29 VITALS — SYSTOLIC BLOOD PRESSURE: 102 MMHG | DIASTOLIC BLOOD PRESSURE: 70 MMHG | WEIGHT: 160 LBS | BODY MASS INDEX: 30.48 KG/M2

## 2022-03-29 DIAGNOSIS — E53.8 VITAMIN B12 DEFICIENCY (NON ANEMIC): Primary | ICD-10-CM

## 2022-03-29 DIAGNOSIS — O09.529 SUPERVISION OF HIGH-RISK PREGNANCY OF ELDERLY MULTIGRAVIDA: Primary | ICD-10-CM

## 2022-03-29 DIAGNOSIS — O99.119 THROMBOCYTOPENIA AFFECTING PREGNANCY (H): ICD-10-CM

## 2022-03-29 DIAGNOSIS — D69.6 THROMBOCYTOPENIA AFFECTING PREGNANCY (H): ICD-10-CM

## 2022-03-29 DIAGNOSIS — O43.119 ANTEPARTUM PLACENTA CIRCUMVALLATA: ICD-10-CM

## 2022-03-29 LAB — HAPTOGLOB SERPL-MCNC: 111 MG/DL (ref 32–197)

## 2022-03-29 PROCEDURE — 99207 PR PRENATAL VISIT: CPT | Performed by: OBSTETRICS & GYNECOLOGY

## 2022-03-29 RX ORDER — LANOLIN ALCOHOL/MO/W.PET/CERES
1000 CREAM (GRAM) TOPICAL DAILY
Qty: 30 TABLET | Refills: 3 | Status: SHIPPED | OUTPATIENT
Start: 2022-03-29 | End: 2022-05-04

## 2022-03-29 NOTE — PROGRESS NOTES
IUP at 23w2d     1. PNC  2. AMA: level II  3. Pelvic pressure: will try support belt and decrease standing time at work to 50% of the day, letter provided.   4. Thrombocytopenia: s/p Heme consult, evalaution underway.  5. Circumvallate placenta: repeat growth 4/12    Return to clinic in 4w. Plan GCT at that time.     Natalie Soliz MD

## 2022-03-29 NOTE — LETTER
[facility]  [address]  [address]  [facility] [address]  [phone]  Dept: [phone]      3/29/2022    Re: Allison Henao      TO WHOM IT MAY CONCERN:    Allison Henao  was seen on 3/29/2022 for management of her pregnancy.  She will need to have the option for seated work for 50% of her work day due to discomfort of pregnancy.     Cordially          Natalie Soliz MD  [facility]

## 2022-03-29 NOTE — PROGRESS NOTES
Per Leonora ROMERO -     Patient's daughter calling back to speak with RNCC about message left for her mother earlier in the day. Please call daughter back at [phone].     Renetta Calzada CMA on 3/29/2022 at 12:24 PM            Documentation    Dot Villanueva RN on 3/29/2022 at 1:05 PM

## 2022-03-29 NOTE — PROGRESS NOTES
Writer returned call to Allison's daughter with Dr. Roque's recommendations. She is going to give her mother this information. She has no further questions for our team.    Dot Villanueva RN on 3/29/2022 at 1:07 PM

## 2022-03-29 NOTE — NURSING NOTE
"Chief Complaint   Patient presents with     Prenatal Care       Initial /70   Wt 72.6 kg (160 lb)   LMP 10/17/2021   Breastfeeding No   BMI 30.48 kg/m   Estimated body mass index is 30.48 kg/m  as calculated from the following:    Height as of 3/28/22: 1.543 m (5' 0.75\").    Weight as of this encounter: 72.6 kg (160 lb).  BP completed using cuff size: regular    Questioned patient about current smoking habits.  Pt. has never smoked.          23w2d  + FM noted  + pelvic pressure  - bleeding  + mild cramping  - headache   + mild heartburn  Ayla Diego LPN               "

## 2022-03-29 NOTE — TELEPHONE ENCOUNTER
Patient's daughter calling back to speak with RNCC about message left for her mother earlier in the day. Please call daughter back at [phone].    Renetta Calzada CMA on 3/29/2022 at 12:24 PM

## 2022-03-29 NOTE — PROGRESS NOTES
Per Dr. Roque:     Please contact Allison and let her know I will be sending B12 1000 micrograms daily to her pharmacy for borderline B12 levels.    Also, her hemoglobin has improved into the 12's. Patient should continue on daily PO iron, VitC,  and prenatal vitamin as she has been taking. I will cancel the order for IV iron for now. We are monitoring her CBC weekly. We can easily give IV iron at any time if hemoglobin decreases. However, she has had good repletion with PO iron.    Writer left message on Allison's phone with  services to return call to writer to discuss Dr. Roque's recommendations.    Dot Villanueva RN on 3/29/2022 at 10:05 AM

## 2022-03-31 LAB
ANA PAT SER IF-IMP: ABNORMAL
ANA SER QL IF: POSITIVE
ANA TITR SER IF: ABNORMAL {TITER}
PATH REPORT.COMMENTS IMP SPEC: NORMAL
PATH REPORT.COMMENTS IMP SPEC: NORMAL
PATH REPORT.FINAL DX SPEC: NORMAL
PATH REPORT.MICROSCOPIC SPEC OTHER STN: NORMAL
PATH REPORT.MICROSCOPIC SPEC OTHER STN: NORMAL
PATH REPORT.RELEVANT HX SPEC: NORMAL

## 2022-03-31 PROCEDURE — 85060 BLOOD SMEAR INTERPRETATION: CPT | Performed by: PATHOLOGY

## 2022-04-05 NOTE — PROGRESS NOTES
[facility]    Hematology/Oncology Established Patient Follow-up Note    Treatment Summary:      Today's Date: 22    Reason for Follow-up: Thrombocytopenia affecting pregnancy  Referring Provider: Natalie Soliz MD      HISTORY OF PRESENT ILLNESS: Allison Henao is a  42 year old female who is referred for anemia and thrombocytopenia in pregnancy. She is currently 24 weeks gestation. Past medical history is significant for ovarian cyst, prediabetes, HLD. She is Hebrew-speaking and history is obtained via virtual .     She is originally from [facility] and emigrated to the  in 2007. She has delivered her two children in [facility] without CBC abnormality, both alive and well. Both were delivered vaginally without excessive bleed. [place] had one miscarriage in  at 6 weeks gestation. She had vaginal bleeding for 10 days.     Since , WBC has ranged 5.9-8.4, hemoglobin has decreased from 12.8 to 10.7 (MCV 80s), platelets have ranged 109-178. On 22, ferritin 10, TIBC 512, iron sat 13%. HIV negative. Hep B surface antigen negative. She takes iron and prenatal vitamin.     No epistaxis, gingival bleed, petechiae, hematuria/hematochezia/melena, or vaginal bleed. LMP was 2021. Planned for vaginal delivery 22.     She denies malar rash. She has intermittent arthralgias, but in the shoulders. 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       INTERIM HISTORY:  No epistaxis, gingival bleed, vaginal bleed, hematuria, hematochezia/melena. Dark stools with iron supplementation. Taking B12 daily at night.      REVIEW OF SYSTEMS:   A 14 point ROS was reviewed with pertinent positives and negatives in the HPI.       HOME MEDICATIONS:  Current Outpatient Medications   Medication Sig Dispense Refill     cyanocobalamin (VITAMIN B-12) 1000 MCG tablet Take 1 tablet (1,000 mcg) by mouth daily 30 tablet 3     ferrous sulfate (FE TABS) 325 (65 Fe)  "MG EC tablet Take 325 mg by mouth 2 times daily       Prenatal Vit-Fe Fumarate-FA (PRENATAL VITAMIN) 27-0.8 MG TABS Take 1 tablet by mouth daily 90 tablet 3         ALLERGIES:  No Known Allergies      PAST MEDICAL HISTORY:  Past Medical History:   Diagnosis Date     Ovarian cyst          PAST SURGICAL HISTORY:  Past Surgical History:   Procedure Laterality Date     ovarian cystectomy  09/21/2018         SOCIAL HISTORY:  Social History     Socioeconomic History     Marital status:      Spouse name: Not on file     Number of children: Not on file     Years of education: Not on file     Highest education level: Not on file   Occupational History     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, [employer]   Tobacco Use     Smoking status: Never Smoker     Smokeless tobacco: Never Used   Vaping Use     Vaping Use: Never used   Substance and Sexual Activity     Alcohol use: Not Currently     Drug use: Never     Sexual activity: Yes     Partners: Male   Other Topics Concern     Not on file   Social History Narrative     Not on file     Social Determinants of Health     Financial Resource Strain: Not on file   Food Insecurity: Not on file   Transportation Needs: Not on file   Physical Activity: Not on file   Stress: Not on file   Social Connections: Not on file   Intimate Partner Violence: Not on file   Housing Stability: Not on file         FAMILY HISTORY:  Family History   Problem Relation Age of Onset     Thyroid Disease Mother      Pacemaker Mother      Pacemaker Father    Sister- DM2.  No rheumatologic disorders.      PHYSICAL EXAM:  Vital signs:  /61   Pulse 75   Temp 97.2  F (36.2  C) (Tympanic)   Resp 16   Ht 1.543 m (5' 0.75\")   Wt 73.9 kg (162 lb 14.4 oz)   LMP 10/17/2021   SpO2 98%   BMI 31.03 kg/m     GENERAL/CONSTITUTIONAL: No acute distress.  EYES: Pupils are equal, round, and react to light and accommodation. Extraocular movements intact.  No scleral icterus.  ENT/MOUTH: Neck supple. Oropharynx clear, no " mucositis.  LYMPH: No anterior cervical, posterior cervical, supraclavicular, axillary or inguinal adenopathy.   RESPIRATORY: Clear to auscultation bilaterally. No crackles or wheezing.   CARDIOVASCULAR: Regular rate and rhythm without murmurs, gallops, or rubs.  GASTROINTESTINAL: No hepatosplenomegaly, masses, or tenderness. The patient has normal bowel sounds. No guarding.  No distention. Gravid abdomen.  MUSCULOSKELETAL: Warm and well-perfused, no cyanosis, clubbing, or edema.  NEUROLOGIC: Cranial nerves II-XII are intact. Alert, oriented, answers questions appropriately.  INTEGUMENTARY: No rashes or jaundice.  GAIT: Steady, does not use assistive device      LABS:  CBC RESULTS:   Recent Labs   Lab Test 03/28/22  1010   WBC 7.3   RBC 4.22   HGB 12.4   HCT 37.8   MCV 90   MCH 29.4   MCHC 32.8   RDW 14.5   *       Recent Labs   Lab Test 03/28/22  1010 09/28/15  1902    137   POTASSIUM 3.6 3.6   CHLORIDE 109 105   CO2 24 24   ANIONGAP 4 8   GLC 88 96   BUN 9 10   CR 0.41* 0.52   DENITA 8.7 8.7      Ref. Range 3/28/2022 10:10   Protein Total Latest Ref Range: 6.8 - 8.8 g/dL 7.1   Bilirubin Total Latest Ref Range: 0.2 - 1.3 mg/dL 0.8   Alkaline Phosphatase Latest Ref Range: 40 - 150 U/L 86   ALT Latest Ref Range: 0 - 50 U/L 27   AST Latest Ref Range: 0 - 45 U/L 20   Folate Latest Ref Range: >=5.4 ng/mL 36.1   Lactate Dehydrogenase Latest Ref Range: 81 - 234 U/L 158   Vitamin B12 Latest Ref Range: 193 - 986 pg/mL 227      Ref. Range 3/28/2022 10:10   INR Latest Ref Range: 0.85 - 1.15  1.00   PTT Latest Ref Range: 22 - 38 Seconds 39 (H)   Fibrinogen Latest Ref Range: 170 - 490 mg/dL 434      Ref. Range 3/28/2022 10:10   Haptoglobin Latest Ref Range: 32 - 197 mg/dL 111     Component Ref Range & Units 8 d ago      LEEANN interpretation Negative Positive Abnormal     Comment:    Negative:              <1:40   Borderline Positive:   1:40 - 1:80   Positive:              >1:80    LEEANN pattern 1  Speckled     LEEANN titer 1   1:160          [facility]ME BLOOD  [facility]  2020     Component 2020             WBC 5.7 4.9 5.7   RBC 4.43 4.62 4.59   Hemoglobin 12.4 12.8 12.8   HCT 37.4 39.2 38.8   MCV 84.4 84.8 84.5   MCH 28.0 27.7 27.9   MCHC 33.2 32.7 33.0   RDW 13.2 13.3 12.9   Platelets 157 153 137 Low       PMN/Band -- -- --   Lymph -- -- --   Mono -- -- --   Eos -- -- --   Baso -- -- --   Neutrophil Absolute 3.4 3.0 4.3   Lymph Absolute -- -- --   Mono  Absolute -- -- --   Eos   Absolute -- -- --   Baso  Absolute -- -- --   MPV -- -- --   Lymphocyte Absolute 1.8 1.4 1.0   Monocytes Absolute 0.4 0.3 0.3   Eosinophil Absolute 0.1 0.1 0.1   Basophil Absolute 0.0 0.0 0.0   Immature Gran % 0.0 0.0 0.0            PATHOLOGY:  Final Diagnosis 3/28/22:   A.  Peripheral blood smear for morphology:  - Isolated, borderline thrombocytopenia.          IMAGING:  N/A     ASSESSMENT/PLAN:  Allison Henao is a  42 year old female who is referred for anemia and thrombocytopenia in pregnancy. She is currently 24 weeks gestation. Past medical history is significant for ovarian cyst, prediabetes, HLD. She is New Zealander-speaking and history is obtained via virtual . Due date is 22.     Discussed with patient her past medical history, previous pregnancies, and current laboratory findings. She has evidence of iron deficiency anemia due to pregnancy and will supplement with venofer 300 mg weekly x3 weeks. She should continue daily iron, VitC, and prenatal vitamin.      For thrombocytopenia, differential includes gestational thrombocytopenia as platelets continue to be >100K. However, we discussed the possibility of ITP and need to monitor for HELLP/DIC. We discussed the possibility of steroid pulse or IVIG in the future.     Repeat CBC returned with PLT of 147K. WBC and Hb WNL. Renal function and LFTs WNL. LDH and haptoglobin WNL. Folate 36.1. B12 borderline at 227 and started on supplementation. Coag studies had  slight elevation of PTT of 39, otherwise WNL. LEEANN returned 1:160. Peripheral smear with isolated, borderline thrombocytopenia. Otherwise, no other abnormalities.     -Patient's thrombocytopenia is most likely due to gestational thrombocytopenia (physiologic).   -Repeat labs every 2 weeks starting today.   -Continue once daily ferrous sulfate, VitC, B12 supplementation.  -Follow up with PCP for positive LEEANN.   -Follow up with me in clinic in 1 month.     Thank you referring Mrs. Henao to clinic. Adequate time was dedicated to patient questions and answered to the expressed satisfaction of the patient.             Zari Roque, DO  Hematology/Oncology  [facility]

## 2022-04-06 ENCOUNTER — APPOINTMENT (OUTPATIENT)
Dept: INTERPRETER SERVICES | Facility: CLINIC | Age: 42
End: 2022-04-06
Payer: COMMERCIAL

## 2022-04-06 ENCOUNTER — ONCOLOGY VISIT (OUTPATIENT)
Dept: ONCOLOGY | Facility: CLINIC | Age: 42
End: 2022-04-06
Attending: INTERNAL MEDICINE
Payer: COMMERCIAL

## 2022-04-06 VITALS
TEMPERATURE: 97.2 F | SYSTOLIC BLOOD PRESSURE: 104 MMHG | RESPIRATION RATE: 16 BRPM | BODY MASS INDEX: 30.76 KG/M2 | HEART RATE: 75 BPM | HEIGHT: 61 IN | DIASTOLIC BLOOD PRESSURE: 61 MMHG | WEIGHT: 162.9 LBS | OXYGEN SATURATION: 98 %

## 2022-04-06 DIAGNOSIS — O99.119 THROMBOCYTOPENIA AFFECTING PREGNANCY (H): ICD-10-CM

## 2022-04-06 DIAGNOSIS — D69.6 THROMBOCYTOPENIA AFFECTING PREGNANCY (H): ICD-10-CM

## 2022-04-06 LAB
ALBUMIN SERPL-MCNC: 2.6 G/DL (ref 3.4–5)
ALP SERPL-CCNC: 91 U/L (ref 40–150)
ALT SERPL W P-5'-P-CCNC: 26 U/L (ref 0–50)
ANION GAP SERPL CALCULATED.3IONS-SCNC: 5 MMOL/L (ref 3–14)
APTT PPP: 36 SECONDS (ref 22–38)
AST SERPL W P-5'-P-CCNC: 16 U/L (ref 0–45)
BASOPHILS # BLD AUTO: 0 10E3/UL (ref 0–0.2)
BASOPHILS NFR BLD AUTO: 0 %
BILIRUB SERPL-MCNC: 0.2 MG/DL (ref 0.2–1.3)
BUN SERPL-MCNC: 7 MG/DL (ref 7–30)
CALCIUM SERPL-MCNC: 8.9 MG/DL (ref 8.5–10.1)
CHLORIDE BLD-SCNC: 108 MMOL/L (ref 94–109)
CO2 SERPL-SCNC: 25 MMOL/L (ref 20–32)
CREAT SERPL-MCNC: 0.44 MG/DL (ref 0.52–1.04)
EOSINOPHIL # BLD AUTO: 0 10E3/UL (ref 0–0.7)
EOSINOPHIL NFR BLD AUTO: 1 %
ERYTHROCYTE [DISTWIDTH] IN BLOOD BY AUTOMATED COUNT: 14.1 % (ref 10–15)
FIBRINOGEN PPP-MCNC: 405 MG/DL (ref 170–490)
GFR SERPL CREATININE-BSD FRML MDRD: >90 ML/MIN/1.73M2
GLUCOSE BLD-MCNC: 95 MG/DL (ref 70–99)
HCT VFR BLD AUTO: 35.4 % (ref 35–47)
HGB BLD-MCNC: 11.5 G/DL (ref 11.7–15.7)
IMM GRANULOCYTES # BLD: 0 10E3/UL
IMM GRANULOCYTES NFR BLD: 1 %
INR PPP: 1.05 (ref 0.85–1.15)
LDH SERPL L TO P-CCNC: 179 U/L (ref 81–234)
LYMPHOCYTES # BLD AUTO: 1.2 10E3/UL (ref 0.8–5.3)
LYMPHOCYTES NFR BLD AUTO: 19 %
MCH RBC QN AUTO: 29.6 PG (ref 26.5–33)
MCHC RBC AUTO-ENTMCNC: 32.5 G/DL (ref 31.5–36.5)
MCV RBC AUTO: 91 FL (ref 78–100)
MONOCYTES # BLD AUTO: 0.4 10E3/UL (ref 0–1.3)
MONOCYTES NFR BLD AUTO: 6 %
NEUTROPHILS # BLD AUTO: 4.5 10E3/UL (ref 1.6–8.3)
NEUTROPHILS NFR BLD AUTO: 73 %
NRBC # BLD AUTO: 0 10E3/UL
NRBC BLD AUTO-RTO: 0 /100
PLATELET # BLD AUTO: 142 10E3/UL (ref 150–450)
POTASSIUM BLD-SCNC: 3.8 MMOL/L (ref 3.4–5.3)
PROT SERPL-MCNC: 6.6 G/DL (ref 6.8–8.8)
RBC # BLD AUTO: 3.89 10E6/UL (ref 3.8–5.2)
RETICS # AUTO: 0.09 10E6/UL (ref 0.03–0.1)
RETICS/RBC NFR AUTO: 2.2 % (ref 0.5–2)
SODIUM SERPL-SCNC: 138 MMOL/L (ref 133–144)
WBC # BLD AUTO: 6.1 10E3/UL (ref 4–11)

## 2022-04-06 PROCEDURE — 85610 PROTHROMBIN TIME: CPT | Performed by: INTERNAL MEDICINE

## 2022-04-06 PROCEDURE — 83615 LACTATE (LD) (LDH) ENZYME: CPT | Performed by: INTERNAL MEDICINE

## 2022-04-06 PROCEDURE — 85025 COMPLETE CBC W/AUTO DIFF WBC: CPT | Performed by: INTERNAL MEDICINE

## 2022-04-06 PROCEDURE — 99214 OFFICE O/P EST MOD 30 MIN: CPT | Performed by: INTERNAL MEDICINE

## 2022-04-06 PROCEDURE — 83010 ASSAY OF HAPTOGLOBIN QUANT: CPT | Performed by: INTERNAL MEDICINE

## 2022-04-06 PROCEDURE — G0463 HOSPITAL OUTPT CLINIC VISIT: HCPCS

## 2022-04-06 PROCEDURE — 85384 FIBRINOGEN ACTIVITY: CPT | Performed by: INTERNAL MEDICINE

## 2022-04-06 PROCEDURE — 36415 COLL VENOUS BLD VENIPUNCTURE: CPT

## 2022-04-06 PROCEDURE — 85045 AUTOMATED RETICULOCYTE COUNT: CPT | Performed by: INTERNAL MEDICINE

## 2022-04-06 PROCEDURE — 80053 COMPREHEN METABOLIC PANEL: CPT | Performed by: INTERNAL MEDICINE

## 2022-04-06 PROCEDURE — 85730 THROMBOPLASTIN TIME PARTIAL: CPT | Performed by: INTERNAL MEDICINE

## 2022-04-06 ASSESSMENT — PAIN SCALES - GENERAL: PAINLEVEL: NO PAIN (0)

## 2022-04-06 NOTE — Clinical Note
2022         RE: Allison Henao  [id number] [facility] [address]        Dear Colleague,    Thank you for referring your patient, Allison Henao, to the [facility] [place]. Please see a copy of my visit note below.    [facility]    Hematology/Oncology Established Patient Follow-up Note    Treatment Summary:      Today's Date: 22    Reason for Follow-up: Thrombocytopenia affecting pregnancy  Referring Provider: Natalie Soliz MD      HISTORY OF PRESENT ILLNESS: Allison Henao is a  42 year old female who is referred for anemia and thrombocytopenia in pregnancy. She is currently 23 weeks gestation. Past medical history is significant for ovarian cyst, prediabetes, HLD. She is Mongolian-speaking and history is obtained via virtual .     She is originally from [facility] [place] and [place] the [place] in [place]. She has delivered her two children in [facility] without CBC abnormality, both alive and well. Both were delivered vaginally without excessive bleed. [place] she has had one miscarriage in  at 6 weeks gestation. She had vaginal bleeding for 10 days.     Since , WBC has ranged 5.9-8.4, hemoglobin has decreased from 12.8 to 10.7 (MCV 80s), platelets have ranged 109-178. On 22, ferritin 10, TIBC 512, iron sat 13%. HIV negative. Hep B surface antigen negative. She takes iron and prenatal vitamin.     No epistaxis, gingival bleed, petechiae, hematuria/hematochezia/melena, or vaginal bleed. LMP was 2021. Planned for vaginal delivery 22.     She denies malar rash. She has intermittent arthralgias, but in the shoulders. 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       INTERIM HISTORY:  No epistaxis, gingival bleed, vaginal bleed, hematuria, hematochezia/melena. Dark stools with iron supplementation. Taking B12 daily at night.      REVIEW OF SYSTEMS:   A 14 point ROS was reviewed with pertinent positives  "and negatives in the HPI.       HOME MEDICATIONS:  Current Outpatient Medications   Medication Sig Dispense Refill     cyanocobalamin (VITAMIN B-12) 1000 MCG tablet Take 1 tablet (1,000 mcg) by mouth daily 30 tablet 3     ferrous sulfate (FE TABS) 325 (65 Fe) MG EC tablet Take 325 mg by mouth 2 times daily       Prenatal Vit-Fe Fumarate-FA (PRENATAL VITAMIN) 27-0.8 MG TABS Take 1 tablet by mouth daily 90 tablet 3         ALLERGIES:  No Known Allergies      PAST MEDICAL HISTORY:  Past Medical History:   Diagnosis Date     Ovarian cyst          PAST SURGICAL HISTORY:  Past Surgical History:   Procedure Laterality Date     ovarian cystectomy  09/21/2018         SOCIAL HISTORY:  Social History     Socioeconomic History     Marital status:      Spouse name: Not on file     Number of children: Not on file     Years of education: Not on file     Highest education level: Not on file   Occupational History     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, [employer]   Tobacco Use     Smoking status: Never Smoker     Smokeless tobacco: Never Used   Vaping Use     Vaping Use: Never used   Substance and Sexual Activity     Alcohol use: Not Currently     Drug use: Never     Sexual activity: Yes     Partners: Male   Other Topics Concern     Not on file   Social History Narrative     Not on file     Social Determinants of Health     Financial Resource Strain: Not on file   Food Insecurity: Not on file   Transportation Needs: Not on file   Physical Activity: Not on file   Stress: Not on file   Social Connections: Not on file   Intimate Partner Violence: Not on file   Housing Stability: Not on file         FAMILY HISTORY:  Family History   Problem Relation Age of Onset     Thyroid Disease Mother      Pacemaker Mother      Pacemaker Father    Sister- DM2.  No rheumatologic disorders.      PHYSICAL EXAM:  Vital signs:  /61   Pulse 75   Temp 97.2  F (36.2  C) (Tympanic)   Resp 16   Ht 1.543 m (5' 0.75\")   Wt 73.9 kg (162 lb 14.4 oz)   " LMP 10/17/2021   SpO2 98%   BMI 31.03 kg/m     GENERAL/CONSTITUTIONAL: No acute distress.  EYES: Pupils are equal, round, and react to light and accommodation. Extraocular movements intact.  No scleral icterus.  ENT/MOUTH: Neck supple. Oropharynx clear, no mucositis.  LYMPH: No anterior cervical, posterior cervical, supraclavicular, axillary or inguinal adenopathy.   RESPIRATORY: Clear to auscultation bilaterally. No crackles or wheezing.   CARDIOVASCULAR: Regular rate and rhythm without murmurs, gallops, or rubs.  GASTROINTESTINAL: No hepatosplenomegaly, masses, or tenderness. The patient has normal bowel sounds. No guarding.  No distention. Gravid abdomen.  MUSCULOSKELETAL: Warm and well-perfused, no cyanosis, clubbing, or edema.  NEUROLOGIC: Cranial nerves II-XII are intact. Alert, oriented, answers questions appropriately.  INTEGUMENTARY: No rashes or jaundice.  GAIT: Steady, does not use assistive device      LABS:  CBC RESULTS:   Recent Labs   Lab Test 03/28/22  1010   WBC 7.3   RBC 4.22   HGB 12.4   HCT 37.8   MCV 90   MCH 29.4   MCHC 32.8   RDW 14.5   *       Recent Labs   Lab Test 03/28/22  1010 09/28/15  1902    137   POTASSIUM 3.6 3.6   CHLORIDE 109 105   CO2 24 24   ANIONGAP 4 8   GLC 88 96   BUN 9 10   CR 0.41* 0.52   DENITA 8.7 8.7      Ref. Range 3/28/2022 10:10   Protein Total Latest Ref Range: 6.8 - 8.8 g/dL 7.1   Bilirubin Total Latest Ref Range: 0.2 - 1.3 mg/dL 0.8   Alkaline Phosphatase Latest Ref Range: 40 - 150 U/L 86   ALT Latest Ref Range: 0 - 50 U/L 27   AST Latest Ref Range: 0 - 45 U/L 20   Folate Latest Ref Range: >=5.4 ng/mL 36.1   Lactate Dehydrogenase Latest Ref Range: 81 - 234 U/L 158   Vitamin B12 Latest Ref Range: 193 - 986 pg/mL 227      Ref. Range 3/28/2022 10:10   INR Latest Ref Range: 0.85 - 1.15  1.00   PTT Latest Ref Range: 22 - 38 Seconds 39 (H)   Fibrinogen Latest Ref Range: 170 - 490 mg/dL 434      Ref. Range 3/28/2022 10:10   Haptoglobin Latest Ref Range: 32 -  197 mg/dL 111     Component Ref Range & Units 8 d ago      LEEANN interpretation Negative Positive Abnormal     Comment:    Negative:              <1:40   Borderline Positive:   1:40 - 1:80   Positive:              >1:80    LEEANN pattern 1  Speckled     LEEANN titer 1  1:160          [facility] BLOOD  [facility]  2020     Component 2020             WBC 5.7 4.9 5.7   RBC 4.43 4.62 4.59   Hemoglobin 12.4 12.8 12.8   HCT 37.4 39.2 38.8   MCV 84.4 84.8 84.5   MCH 28.0 27.7 27.9   MCHC 33.2 32.7 33.0   RDW 13.2 13.3 12.9   Platelets 157 153 137 Low       PMN/Band -- -- --   Lymph -- -- --   Mono -- -- --   Eos -- -- --   Baso -- -- --   Neutrophil Absolute 3.4 3.0 4.3   Lymph Absolute -- -- --   Mono  Absolute -- -- --   Eos   Absolute -- -- --   Baso  Absolute -- -- --   MPV -- -- --   Lymphocyte Absolute 1.8 1.4 1.0   Monocytes Absolute 0.4 0.3 0.3   Eosinophil Absolute 0.1 0.1 0.1   Basophil Absolute 0.0 0.0 0.0   Immature Gran % 0.0 0.0 0.0            PATHOLOGY:  Final Diagnosis 3/28/22:   A.  Peripheral blood smear for morphology:  - Isolated, borderline thrombocytopenia.          IMAGING:  N/A     ASSESSMENT/PLAN:  Allison Henao is a  42 year old female who is referred for anemia and thrombocytopenia in pregnancy. She is currently 23 weeks gestation. Past medical history is significant for ovarian cyst, prediabetes, HLD. She is Nauruan-speaking and history is obtained via [employer] . Due date is 22.     Discussed with patient her past medical history, previous pregnancies, and current laboratory findings. She has evidence of iron deficiency anemia due to pregnancy and will supplement with venofer 300 mg weekly x3 weeks. She should continue daily iron, VitC, and prenatal vitamin.      For thrombocytopenia, differential includes gestational thrombocytopenia as platelets continue to be >100K. However, we discussed the possibility of ITP and need to monitor for HELLP/DIC. We  discussed the possibility of steroid pulse or IVIG in the future.     Repeat CBC returned with PLT of 147K. WBC and Hb WNL. Renal function and LFTs WNL. LDH and haptoglobin WNL. Folate 36.1. B12 borderline at 227 and started on supplementation. Coag studies had slight elevation of PTT of 39, otherwise WNL. LEEANN returned 1:160. Peripheral smear with isolated, borderline thrombocytopenia. Otherwise, no other abnormalities.     -Patient's thrombocytopenia is most likely due to gestational thrombocytopenia (physiologic).   -Repeat labs every 2 weeks starting today.   -Continue once daily ferrous sulfate, VitC, B12 supplementation.  -Follow up with PCP for positive LEEANN.   -Follow up with me in clinic in 1 month.     Thank you referring Mrs. Henao to clinic. Adequate time was dedicated to patient questions and answered to the expressed satisfaction of the patient.             Zari Roque DO  Hematology/Oncology  [facility]              Again, thank you for allowing me to participate in the care of your patient.        Sincerely,        Zari Roque DO

## 2022-04-06 NOTE — PROGRESS NOTES
Medical Assistant Note:  Allison Henao presents today for lab.    Patient seen by provider today: Yes: Dr. Roque.   present during visit today: Not Applicable.    Concerns: No Concerns.    Procedure:  Labs drawn: .    Post Assessment:  Labs drawn without difficulty: Yes.    Discharge Plan:  Departure Mode: Ambulatory.    Face to Face Time: 10 minutes.    Adri Beck, CMA

## 2022-04-06 NOTE — NURSING NOTE
"Oncology Rooming Note    April 6, 2022 11:31 AM   Allison Henao is a 42 year old female who presents for:    Chief Complaint   Patient presents with     Oncology Clinic Visit     Iron deficiency anemia due to pregnancy     Initial Vitals: /61   Pulse 75   Temp 97.2  F (36.2  C) (Tympanic)   Resp 16   Ht 1.543 m (5' 0.75\")   Wt 73.9 kg (162 lb 14.4 oz)   LMP 10/17/2021   SpO2 98%   BMI 31.03 kg/m   Estimated body mass index is 31.03 kg/m  as calculated from the following:    Height as of this encounter: 1.543 m (5' 0.75\").    Weight as of this encounter: 73.9 kg (162 lb 14.4 oz). Body surface area is 1.78 meters squared.  No Pain (0) Comment: Data Unavailable   Patient's last menstrual period was 10/17/2021.  Allergies reviewed: Yes  Medications reviewed: Yes    Medications: Medication refills not needed today.  Pharmacy name entered into [employer]: [id number] - [facility] [address] AT [place] [address]    Clinical concerns: f/u       Lacy Cornejo CMA              "

## 2022-04-07 LAB — HAPTOGLOB SERPL-MCNC: 111 MG/DL (ref 32–197)

## 2022-04-07 NOTE — PATIENT INSTRUCTIONS
Allison is scheduled for the following:     - Labs on 04/20/22 at 11:00 am  - Labs on 05/04/22 at 12:45 pm  - Return with Dr. Roque on 05/04/22 at 1:00 pm    Dot Villanueva RN on 4/7/2022 at 9:12 AM

## 2022-04-08 LAB
PATH REPORT.COMMENTS IMP SPEC: NORMAL
PATH REPORT.FINAL DX SPEC: NORMAL
PATH REPORT.MICROSCOPIC SPEC OTHER STN: NORMAL
PATH REPORT.MICROSCOPIC SPEC OTHER STN: NORMAL
PATH REPORT.RELEVANT HX SPEC: NORMAL

## 2022-04-08 PROCEDURE — 85060 BLOOD SMEAR INTERPRETATION: CPT | Performed by: PATHOLOGY

## 2022-04-12 ENCOUNTER — OFFICE VISIT (OUTPATIENT)
Dept: MATERNAL FETAL MEDICINE | Facility: CLINIC | Age: 42
End: 2022-04-12
Attending: OBSTETRICS & GYNECOLOGY
Payer: COMMERCIAL

## 2022-04-12 ENCOUNTER — HOSPITAL ENCOUNTER (OUTPATIENT)
Dept: ULTRASOUND IMAGING | Facility: CLINIC | Age: 42
Discharge: HOME | End: 2022-04-12
Attending: OBSTETRICS & GYNECOLOGY
Payer: COMMERCIAL

## 2022-04-12 DIAGNOSIS — O09.522 AMA (ADVANCED MATERNAL AGE) MULTIGRAVIDA 35+, SECOND TRIMESTER: ICD-10-CM

## 2022-04-12 DIAGNOSIS — O43.119 CIRCUMVALLATE PLACENTA, UNSPECIFIED TRIMESTER: ICD-10-CM

## 2022-04-12 DIAGNOSIS — O43.119 CIRCUMVALLATE PLACENTA, UNSPECIFIED TRIMESTER: Primary | ICD-10-CM

## 2022-04-12 PROCEDURE — 76816 OB US FOLLOW-UP PER FETUS: CPT

## 2022-04-12 PROCEDURE — 76816 OB US FOLLOW-UP PER FETUS: CPT | Mod: 26 | Performed by: OBSTETRICS & GYNECOLOGY

## 2022-04-20 ENCOUNTER — LAB (OUTPATIENT)
Dept: LAB | Facility: CLINIC | Age: 42
End: 2022-04-20
Payer: COMMERCIAL

## 2022-04-20 DIAGNOSIS — D69.6 THROMBOCYTOPENIA AFFECTING PREGNANCY (H): ICD-10-CM

## 2022-04-20 DIAGNOSIS — O99.119 THROMBOCYTOPENIA AFFECTING PREGNANCY (H): ICD-10-CM

## 2022-04-20 LAB
BASOPHILS # BLD AUTO: 0 10E3/UL (ref 0–0.2)
BASOPHILS NFR BLD AUTO: 0 %
EOSINOPHIL # BLD AUTO: 0 10E3/UL (ref 0–0.7)
EOSINOPHIL NFR BLD AUTO: 1 %
ERYTHROCYTE [DISTWIDTH] IN BLOOD BY AUTOMATED COUNT: 13.9 % (ref 10–15)
HCT VFR BLD AUTO: 35.2 % (ref 35–47)
HGB BLD-MCNC: 11.6 G/DL (ref 11.7–15.7)
LDH SERPL L TO P-CCNC: 143 U/L (ref 81–234)
LYMPHOCYTES # BLD AUTO: 1.3 10E3/UL (ref 0.8–5.3)
LYMPHOCYTES NFR BLD AUTO: 18 %
MCH RBC QN AUTO: 29.7 PG (ref 26.5–33)
MCHC RBC AUTO-ENTMCNC: 33 G/DL (ref 31.5–36.5)
MCV RBC AUTO: 90 FL (ref 78–100)
MONOCYTES # BLD AUTO: 0.4 10E3/UL (ref 0–1.3)
MONOCYTES NFR BLD AUTO: 6 %
NEUTROPHILS # BLD AUTO: 5.2 10E3/UL (ref 1.6–8.3)
NEUTROPHILS NFR BLD AUTO: 75 %
PLATELET # BLD AUTO: 149 10E3/UL (ref 150–450)
RBC # BLD AUTO: 3.9 10E6/UL (ref 3.8–5.2)
RETICS # AUTO: 0.09 10E6/UL (ref 0.03–0.1)
RETICS/RBC NFR AUTO: 2.3 % (ref 0.5–2)
WBC # BLD AUTO: 7 10E3/UL (ref 4–11)

## 2022-04-20 PROCEDURE — 85025 COMPLETE CBC W/AUTO DIFF WBC: CPT

## 2022-04-20 PROCEDURE — 83615 LACTATE (LD) (LDH) ENZYME: CPT

## 2022-04-20 PROCEDURE — 85730 THROMBOPLASTIN TIME PARTIAL: CPT

## 2022-04-20 PROCEDURE — 80053 COMPREHEN METABOLIC PANEL: CPT

## 2022-04-20 PROCEDURE — 85610 PROTHROMBIN TIME: CPT

## 2022-04-20 PROCEDURE — 36415 COLL VENOUS BLD VENIPUNCTURE: CPT

## 2022-04-20 PROCEDURE — 85060 BLOOD SMEAR INTERPRETATION: CPT | Performed by: PATHOLOGY

## 2022-04-20 PROCEDURE — 85045 AUTOMATED RETICULOCYTE COUNT: CPT

## 2022-04-20 PROCEDURE — 85384 FIBRINOGEN ACTIVITY: CPT

## 2022-04-20 PROCEDURE — 83010 ASSAY OF HAPTOGLOBIN QUANT: CPT

## 2022-04-21 LAB
ALBUMIN SERPL-MCNC: 3.1 G/DL (ref 3.4–5)
ALP SERPL-CCNC: 101 U/L (ref 40–150)
ALT SERPL W P-5'-P-CCNC: 21 U/L (ref 0–50)
ANION GAP SERPL CALCULATED.3IONS-SCNC: 7 MMOL/L (ref 3–14)
APTT PPP: 35 SECONDS (ref 22–38)
AST SERPL W P-5'-P-CCNC: 15 U/L (ref 0–45)
BILIRUB SERPL-MCNC: 0.2 MG/DL (ref 0.2–1.3)
BUN SERPL-MCNC: 9 MG/DL (ref 7–30)
CALCIUM SERPL-MCNC: 9.2 MG/DL (ref 8.5–10.1)
CHLORIDE BLD-SCNC: 106 MMOL/L (ref 94–109)
CO2 SERPL-SCNC: 24 MMOL/L (ref 20–32)
CREAT SERPL-MCNC: 0.39 MG/DL (ref 0.52–1.04)
FIBRINOGEN PPP-MCNC: 357 MG/DL (ref 170–490)
GFR SERPL CREATININE-BSD FRML MDRD: >90 ML/MIN/1.73M2
GLUCOSE BLD-MCNC: 95 MG/DL (ref 70–99)
INR PPP: 1.08 (ref 0.85–1.15)
PATH REPORT.COMMENTS IMP SPEC: NORMAL
PATH REPORT.FINAL DX SPEC: NORMAL
PATH REPORT.MICROSCOPIC SPEC OTHER STN: NORMAL
PATH REPORT.MICROSCOPIC SPEC OTHER STN: NORMAL
PATH REPORT.RELEVANT HX SPEC: NORMAL
POTASSIUM BLD-SCNC: 3.6 MMOL/L (ref 3.4–5.3)
PROT SERPL-MCNC: 7.3 G/DL (ref 6.8–8.8)
SODIUM SERPL-SCNC: 137 MMOL/L (ref 133–144)

## 2022-04-22 LAB — HAPTOGLOB SERPL-MCNC: 123 MG/DL (ref 32–197)

## 2022-04-26 ENCOUNTER — PRENATAL OFFICE VISIT (OUTPATIENT)
Dept: OBGYN | Facility: CLINIC | Age: 42
End: 2022-04-26
Payer: COMMERCIAL

## 2022-04-26 VITALS — WEIGHT: 166 LBS | DIASTOLIC BLOOD PRESSURE: 66 MMHG | BODY MASS INDEX: 31.62 KG/M2 | SYSTOLIC BLOOD PRESSURE: 102 MMHG

## 2022-04-26 DIAGNOSIS — O99.119 THROMBOCYTOPENIA AFFECTING PREGNANCY (H): ICD-10-CM

## 2022-04-26 DIAGNOSIS — D69.6 THROMBOCYTOPENIA AFFECTING PREGNANCY (H): ICD-10-CM

## 2022-04-26 DIAGNOSIS — R76.8 ANA POSITIVE: ICD-10-CM

## 2022-04-26 DIAGNOSIS — O09.529 SUPERVISION OF HIGH-RISK PREGNANCY OF ELDERLY MULTIGRAVIDA: Primary | ICD-10-CM

## 2022-04-26 DIAGNOSIS — R39.15 URINARY URGENCY: ICD-10-CM

## 2022-04-26 DIAGNOSIS — O43.119 ANTEPARTUM PLACENTA CIRCUMVALLATA: ICD-10-CM

## 2022-04-26 LAB
ALBUMIN UR-MCNC: NEGATIVE MG/DL
AMORPH CRY #/AREA URNS HPF: ABNORMAL /HPF
APPEARANCE UR: ABNORMAL
BACTERIA #/AREA URNS HPF: ABNORMAL /HPF
BILIRUB UR QL STRIP: NEGATIVE
COLOR UR AUTO: YELLOW
ERYTHROCYTE [DISTWIDTH] IN BLOOD BY AUTOMATED COUNT: 14 % (ref 10–15)
GLUCOSE 1H P 50 G GLC PO SERPL-MCNC: 112 MG/DL (ref 70–129)
GLUCOSE UR STRIP-MCNC: NEGATIVE MG/DL
HCT VFR BLD AUTO: 33.2 % (ref 35–47)
HGB BLD-MCNC: 10.8 G/DL (ref 11.7–15.7)
HGB UR QL STRIP: ABNORMAL
KETONES UR STRIP-MCNC: NEGATIVE MG/DL
LEUKOCYTE ESTERASE UR QL STRIP: ABNORMAL
MCH RBC QN AUTO: 29.4 PG (ref 26.5–33)
MCHC RBC AUTO-ENTMCNC: 32.5 G/DL (ref 31.5–36.5)
MCV RBC AUTO: 91 FL (ref 78–100)
NITRATE UR QL: NEGATIVE
PH UR STRIP: 7 [PH] (ref 5–7)
PLATELET # BLD AUTO: 134 10E3/UL (ref 150–450)
RBC # BLD AUTO: 3.67 10E6/UL (ref 3.8–5.2)
RBC #/AREA URNS AUTO: ABNORMAL /HPF
SP GR UR STRIP: 1.02 (ref 1–1.03)
SQUAMOUS #/AREA URNS AUTO: ABNORMAL /LPF
UROBILINOGEN UR STRIP-ACNC: 0.2 E.U./DL
WBC # BLD AUTO: 7.6 10E3/UL (ref 4–11)
WBC #/AREA URNS AUTO: ABNORMAL /HPF

## 2022-04-26 PROCEDURE — 82950 GLUCOSE TEST: CPT | Performed by: OBSTETRICS & GYNECOLOGY

## 2022-04-26 PROCEDURE — 86780 TREPONEMA PALLIDUM: CPT | Performed by: OBSTETRICS & GYNECOLOGY

## 2022-04-26 PROCEDURE — 36415 COLL VENOUS BLD VENIPUNCTURE: CPT | Performed by: OBSTETRICS & GYNECOLOGY

## 2022-04-26 PROCEDURE — 99207 PR PRENATAL VISIT: CPT | Performed by: OBSTETRICS & GYNECOLOGY

## 2022-04-26 PROCEDURE — 85027 COMPLETE CBC AUTOMATED: CPT | Performed by: OBSTETRICS & GYNECOLOGY

## 2022-04-26 PROCEDURE — 81001 URINALYSIS AUTO W/SCOPE: CPT | Performed by: OBSTETRICS & GYNECOLOGY

## 2022-04-26 NOTE — PROGRESS NOTES
Routine obstetric visit at 27w2d    Issues:  1. PNC: cbc, RPR, one hr GCT today. Tdap: next visit   2. AMA: level II within normal limits other than circumvallate placenta  3. Thrombocytopenia: s/p Heme consult, ITP vs gest thrombocytopenia. Recheck today and then at least monthly.   4. Circumvallate placenta: repeat growth 4/12 within normal limits, follow up scheduled in [facility].  5. Positive LEEANN titer 1:160. Discussed with [facility], keep growth US as scheduled, no other follow up needed.    This week, urinating more often. Then has urgency right after that. Will check UA.     Serenity Marx MD

## 2022-04-26 NOTE — NURSING NOTE
"Chief Complaint   Patient presents with     Prenatal Care       Initial /66   Wt 75.3 kg (166 lb)   LMP 10/17/2021   Breastfeeding No   BMI 31.62 kg/m   Estimated body mass index is 31.62 kg/m  as calculated from the following:    Height as of 22: 1.543 m (5' 0.75\").    Weight as of this encounter: 75.3 kg (166 lb).  BP completed using cuff size: regular    Questioned patient about current smoking habits.  Pt. has never smoked.          27w2d  + FM daily  + mild headache  + mild heartburn  - bleeding  Ayla Diego LPN               "

## 2022-04-27 LAB — T PALLIDUM AB SER QL: NONREACTIVE

## 2022-05-04 ENCOUNTER — VIRTUAL VISIT (OUTPATIENT)
Dept: INTERPRETER SERVICES | Facility: CLINIC | Age: 42
End: 2022-05-04
Payer: COMMERCIAL

## 2022-05-04 ENCOUNTER — LAB (OUTPATIENT)
Dept: ONCOLOGY | Facility: CLINIC | Age: 42
End: 2022-05-04
Attending: INTERNAL MEDICINE
Payer: COMMERCIAL

## 2022-05-04 VITALS
DIASTOLIC BLOOD PRESSURE: 60 MMHG | BODY MASS INDEX: 31.6 KG/M2 | HEIGHT: 61 IN | HEART RATE: 81 BPM | SYSTOLIC BLOOD PRESSURE: 100 MMHG | OXYGEN SATURATION: 97 % | WEIGHT: 167.4 LBS | RESPIRATION RATE: 16 BRPM | TEMPERATURE: 98 F

## 2022-05-04 DIAGNOSIS — O99.119 THROMBOCYTOPENIA AFFECTING PREGNANCY (H): ICD-10-CM

## 2022-05-04 DIAGNOSIS — D69.6 THROMBOCYTOPENIA AFFECTING PREGNANCY (H): Primary | ICD-10-CM

## 2022-05-04 DIAGNOSIS — E53.8 VITAMIN B12 DEFICIENCY (NON ANEMIC): ICD-10-CM

## 2022-05-04 DIAGNOSIS — D50.0 IRON DEFICIENCY ANEMIA DUE TO CHRONIC BLOOD LOSS: ICD-10-CM

## 2022-05-04 DIAGNOSIS — D69.6 THROMBOCYTOPENIA AFFECTING PREGNANCY (H): ICD-10-CM

## 2022-05-04 DIAGNOSIS — O99.119 THROMBOCYTOPENIA AFFECTING PREGNANCY (H): Primary | ICD-10-CM

## 2022-05-04 LAB
ALBUMIN SERPL-MCNC: 2.6 G/DL (ref 3.4–5)
ALP SERPL-CCNC: 112 U/L (ref 40–150)
ALT SERPL W P-5'-P-CCNC: 20 U/L (ref 0–50)
ANION GAP SERPL CALCULATED.3IONS-SCNC: 7 MMOL/L (ref 3–14)
APTT PPP: 30 SECONDS (ref 22–38)
AST SERPL W P-5'-P-CCNC: 16 U/L (ref 0–45)
BASOPHILS # BLD AUTO: 0 10E3/UL (ref 0–0.2)
BASOPHILS NFR BLD AUTO: 0 %
BILIRUB SERPL-MCNC: 0.2 MG/DL (ref 0.2–1.3)
BUN SERPL-MCNC: 9 MG/DL (ref 7–30)
CALCIUM SERPL-MCNC: 8.8 MG/DL (ref 8.5–10.1)
CHLORIDE BLD-SCNC: 106 MMOL/L (ref 94–109)
CO2 SERPL-SCNC: 23 MMOL/L (ref 20–32)
CREAT SERPL-MCNC: 0.35 MG/DL (ref 0.52–1.04)
EOSINOPHIL # BLD AUTO: 0.1 10E3/UL (ref 0–0.7)
EOSINOPHIL NFR BLD AUTO: 1 %
ERYTHROCYTE [DISTWIDTH] IN BLOOD BY AUTOMATED COUNT: 13.2 % (ref 10–15)
FIBRINOGEN PPP-MCNC: 507 MG/DL (ref 170–490)
GFR SERPL CREATININE-BSD FRML MDRD: >90 ML/MIN/1.73M2
GLUCOSE BLD-MCNC: 85 MG/DL (ref 70–99)
HCT VFR BLD AUTO: 34.8 % (ref 35–47)
HGB BLD-MCNC: 11.3 G/DL (ref 11.7–15.7)
HOLD SPECIMEN: NORMAL
IMM GRANULOCYTES # BLD: 0 10E3/UL
IMM GRANULOCYTES NFR BLD: 1 %
INR PPP: 1.05 (ref 0.85–1.15)
LDH SERPL L TO P-CCNC: 159 U/L (ref 81–234)
LYMPHOCYTES # BLD AUTO: 1.3 10E3/UL (ref 0.8–5.3)
LYMPHOCYTES NFR BLD AUTO: 16 %
MCH RBC QN AUTO: 29.4 PG (ref 26.5–33)
MCHC RBC AUTO-ENTMCNC: 32.5 G/DL (ref 31.5–36.5)
MCV RBC AUTO: 90 FL (ref 78–100)
MONOCYTES # BLD AUTO: 0.4 10E3/UL (ref 0–1.3)
MONOCYTES NFR BLD AUTO: 5 %
NEUTROPHILS # BLD AUTO: 6.1 10E3/UL (ref 1.6–8.3)
NEUTROPHILS NFR BLD AUTO: 77 %
NRBC # BLD AUTO: 0 10E3/UL
NRBC BLD AUTO-RTO: 0 /100
PLATELET # BLD AUTO: 137 10E3/UL (ref 150–450)
POTASSIUM BLD-SCNC: 3.3 MMOL/L (ref 3.4–5.3)
PROT SERPL-MCNC: 6.6 G/DL (ref 6.8–8.8)
RBC # BLD AUTO: 3.85 10E6/UL (ref 3.8–5.2)
RETICS # AUTO: 0.1 10E6/UL (ref 0.03–0.1)
RETICS/RBC NFR AUTO: 2.6 % (ref 0.5–2)
SODIUM SERPL-SCNC: 136 MMOL/L (ref 133–144)
WBC # BLD AUTO: 7.9 10E3/UL (ref 4–11)

## 2022-05-04 PROCEDURE — 83615 LACTATE (LD) (LDH) ENZYME: CPT | Performed by: INTERNAL MEDICINE

## 2022-05-04 PROCEDURE — 80053 COMPREHEN METABOLIC PANEL: CPT | Performed by: INTERNAL MEDICINE

## 2022-05-04 PROCEDURE — 85045 AUTOMATED RETICULOCYTE COUNT: CPT | Performed by: INTERNAL MEDICINE

## 2022-05-04 PROCEDURE — 36415 COLL VENOUS BLD VENIPUNCTURE: CPT

## 2022-05-04 PROCEDURE — 85610 PROTHROMBIN TIME: CPT | Performed by: INTERNAL MEDICINE

## 2022-05-04 PROCEDURE — 83010 ASSAY OF HAPTOGLOBIN QUANT: CPT | Performed by: INTERNAL MEDICINE

## 2022-05-04 PROCEDURE — G0463 HOSPITAL OUTPT CLINIC VISIT: HCPCS | Mod: 25

## 2022-05-04 PROCEDURE — 99214 OFFICE O/P EST MOD 30 MIN: CPT | Performed by: INTERNAL MEDICINE

## 2022-05-04 PROCEDURE — 85025 COMPLETE CBC W/AUTO DIFF WBC: CPT | Performed by: INTERNAL MEDICINE

## 2022-05-04 PROCEDURE — 85730 THROMBOPLASTIN TIME PARTIAL: CPT | Performed by: INTERNAL MEDICINE

## 2022-05-04 PROCEDURE — 85384 FIBRINOGEN ACTIVITY: CPT | Performed by: INTERNAL MEDICINE

## 2022-05-04 RX ORDER — EPINEPHRINE 1 MG/ML
0.3 INJECTION, SOLUTION INTRAMUSCULAR; SUBCUTANEOUS EVERY 5 MIN PRN
Status: CANCELLED | OUTPATIENT
Start: 2022-05-09

## 2022-05-04 RX ORDER — ALBUTEROL SULFATE 0.83 MG/ML
2.5 SOLUTION RESPIRATORY (INHALATION)
Status: CANCELLED | OUTPATIENT
Start: 2022-05-09

## 2022-05-04 RX ORDER — ALBUTEROL SULFATE 90 UG/1
1-2 AEROSOL, METERED RESPIRATORY (INHALATION)
Status: CANCELLED
Start: 2022-05-09

## 2022-05-04 RX ORDER — LANOLIN ALCOHOL/MO/W.PET/CERES
1000 CREAM (GRAM) TOPICAL DAILY
Qty: 30 TABLET | Refills: 3 | Status: SHIPPED | OUTPATIENT
Start: 2022-05-04

## 2022-05-04 RX ORDER — MEPERIDINE HYDROCHLORIDE 25 MG/ML
25 INJECTION INTRAMUSCULAR; INTRAVENOUS; SUBCUTANEOUS EVERY 30 MIN PRN
Status: CANCELLED | OUTPATIENT
Start: 2022-05-09

## 2022-05-04 RX ORDER — METHYLPREDNISOLONE SODIUM SUCCINATE 125 MG/2ML
125 INJECTION, POWDER, LYOPHILIZED, FOR SOLUTION INTRAMUSCULAR; INTRAVENOUS
Status: CANCELLED
Start: 2022-05-09

## 2022-05-04 RX ORDER — NALOXONE HYDROCHLORIDE 0.4 MG/ML
0.2 INJECTION, SOLUTION INTRAMUSCULAR; INTRAVENOUS; SUBCUTANEOUS
Status: CANCELLED | OUTPATIENT
Start: 2022-05-09

## 2022-05-04 RX ORDER — HEPARIN SODIUM,PORCINE 10 UNIT/ML
5 VIAL (ML) INTRAVENOUS
Status: CANCELLED | OUTPATIENT
Start: 2022-05-09

## 2022-05-04 RX ORDER — HEPARIN SODIUM (PORCINE) LOCK FLUSH IV SOLN 100 UNIT/ML 100 UNIT/ML
5 SOLUTION INTRAVENOUS
Status: CANCELLED | OUTPATIENT
Start: 2022-05-09

## 2022-05-04 RX ORDER — DIPHENHYDRAMINE HYDROCHLORIDE 50 MG/ML
50 INJECTION INTRAMUSCULAR; INTRAVENOUS
Status: CANCELLED
Start: 2022-05-09

## 2022-05-04 ASSESSMENT — PAIN SCALES - GENERAL: PAINLEVEL: NO PAIN (0)

## 2022-05-04 NOTE — PROGRESS NOTES
[facility]    Hematology/Oncology Established Patient Follow-up Note    Treatment Summary:      Today's Date: 22    Reason for Follow-up: Thrombocytopenia affecting pregnancy  Referring Provider: Natalie Soliz MD      HISTORY OF PRESENT ILLNESS: Allison Henao is a  42 year old female who is referred for anemia and thrombocytopenia in pregnancy. She is currently 28 weeks gestation. Past medical history is significant for ovarian cyst, prediabetes, HLD. She is Indonesian-speaking and history is obtained via virtual .     She is originally from [facility] [place] [facility] and emigrated to the  in [place]. She has delivered her two children in [facility] without CBC abnormality, both alive and well. Both were delivered vaginally without excessive bleed. [place] had one miscarriage in  at 6 weeks gestation. She had vaginal bleeding for 10 days.     Since , WBC has ranged 5.9-8.4, hemoglobin has decreased from 12.8 to 10.7 (MCV 80s), platelets have ranged 109-178. On 22, ferritin 10, TIBC 512, iron sat 13%. HIV negative. Hep B surface antigen negative. She takes iron and prenatal vitamin.     No epistaxis, gingival bleed, petechiae, hematuria/hematochezia/melena, or vaginal bleed. LMP was 2021. Planned for vaginal delivery 22.     She denies malar rash. She has intermittent arthralgias, but in the shoulders. 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       INTERIM HISTORY:  No epistaxis, gingival bleed, vaginal bleed, hematuria, hematochezia/melena. Dark stools with iron supplementation. Taking B12 daily at night.    She is planned for induction on 22 due to thrombocytopenia and anemia.      REVIEW OF SYSTEMS:   A 14 point ROS was reviewed with pertinent positives and negatives in the HPI.       HOME MEDICATIONS:  Current Outpatient Medications   Medication Sig Dispense Refill     ferrous sulfate (FE TABS) 325 (65 Fe) MG EC tablet Take 325 mg  "by mouth 2 times daily       Prenatal Vit-Fe Fumarate-FA (PRENATAL VITAMIN) 27-0.8 MG TABS Take 1 tablet by mouth daily 90 tablet 3     cyanocobalamin (VITAMIN B-12) 1000 MCG tablet Take 1 tablet (1,000 mcg) by mouth daily (Patient not taking: Reported on 5/4/2022) 30 tablet 3         ALLERGIES:  No Known Allergies      PAST MEDICAL HISTORY:  Past Medical History:   Diagnosis Date     Ovarian cyst          PAST SURGICAL HISTORY:  Past Surgical History:   Procedure Laterality Date     ovarian cystectomy  09/21/2018         SOCIAL HISTORY:  Social History     Socioeconomic History     Marital status:      Spouse name: Not on file     Number of children: Not on file     Years of education: Not on file     Highest education level: Not on file   Occupational History     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, [employer]   Tobacco Use     Smoking status: Never Smoker     Smokeless tobacco: Never Used   Vaping Use     Vaping Use: Never used   Substance and Sexual Activity     Alcohol use: Not Currently     Drug use: Never     Sexual activity: Yes     Partners: Male   Other Topics Concern     Not on file   Social History Narrative     Not on file     Social Determinants of Health     Financial Resource Strain: Not on file   Food Insecurity: Not on file   Transportation Needs: Not on file   Physical Activity: Not on file   Stress: Not on file   Social Connections: Not on file   Intimate Partner Violence: Not At Risk     Fear of Current or Ex-Partner: No     Emotionally Abused: No     Physically Abused: No     Sexually Abused: No   Housing Stability: Not on file         FAMILY HISTORY:  Family History   Problem Relation Age of Onset     Thyroid Disease Mother      Pacemaker Mother      Pacemaker Father          PHYSICAL EXAM:  Vital signs:  /60   Pulse 81   Temp 98  F (36.7  C) (Tympanic)   Resp 16   Ht 1.543 m (5' 0.75\")   Wt 75.9 kg (167 lb 6.4 oz)   LMP 10/17/2021   SpO2 97%   BMI 31.89 kg/m   "   GENERAL/CONSTITUTIONAL: No acute distress.  EYES: Pupils are equal, round, and react to light and accommodation. Extraocular movements intact.  No scleral icterus.  ENT/MOUTH: Neck supple. Oropharynx clear, no mucositis.  LYMPH: No anterior cervical, posterior cervical, supraclavicular, axillary or inguinal adenopathy.   RESPIRATORY: Clear to auscultation bilaterally. No crackles or wheezing.   CARDIOVASCULAR: Regular rate and rhythm without murmurs, gallops, or rubs.  GASTROINTESTINAL: No hepatosplenomegaly, masses, or tenderness. The patient has normal bowel sounds. No guarding.  No distention. Gravid abdomen.  MUSCULOSKELETAL: Warm and well-perfused, no cyanosis, clubbing, or edema.  NEUROLOGIC: Cranial nerves II-XII are intact. Alert, oriented, answers questions appropriately.  INTEGUMENTARY: No rashes or jaundice.  GAIT: Steady, does not use assistive device      LABS:  CBC RESULTS:   Recent Labs   Lab Test 22  1539   WBC 7.6   RBC 3.67*   HGB 10.8*   HCT 33.2*   MCV 91   MCH 29.4   MCHC 32.5   RDW 14.0   *       Recent Labs   Lab Test 22  1602 22  1215    138   POTASSIUM 3.6 3.8   CHLORIDE 106 108   CO2 24 25   ANIONGAP 7 5   GLC 95 95   BUN 9 7   CR 0.39* 0.44*   DENITA 9.2 8.9         PATHOLOGY:  Final Diagnosis 3/28/22:   A.  Peripheral blood smear for morphology:  - Isolated, borderline thrombocytopenia.            IMAGING:  N/A     ASSESSMENT/PLAN:  Allison Henao is a  42 year old female who is referred for anemia and thrombocytopenia in pregnancy. She is currently 24 weeks gestation. Past medical history is significant for ovarian cyst, prediabetes, HLD. She is Polish-speaking and history is obtained via [employer] . Induction is planned for 22.     Discussed with patient her past medical history, previous pregnancies, and current laboratory findings. She has evidence of iron deficiency anemia due to pregnancy and will supplement with venofer 300 mg weekly  x3 weeks. She should continue daily iron, VitC, and prenatal vitamin.      For thrombocytopenia, differential includes gestational thrombocytopenia as platelets continue to be >100K. However, we discussed the possibility of ITP and need to monitor for HELLP/DIC. We discussed the possibility of steroid pulse or IVIG in the future.      Repeat CBC returned with PLT of 147K. WBC and Hb WNL. Renal function and LFTs WNL. LDH and haptoglobin WNL. Folate 36.1. B12 borderline at 227 and started on supplementation. Coag studies had slight elevation of PTT of 39, otherwise WNL. LEEANN returned 1:160. Peripheral smear with isolated, borderline thrombocytopenia. Otherwise, no other abnormalities.      -Patient's thrombocytopenia is most likely due to gestational thrombocytopenia (physiologic).   -Repeat labs every 2 weeks starting today.   -Continue once daily ferrous sulfate, VitC, B12 supplementation.  -As patient's Hb is now <11, will write for venofer 300 mg weekly x 3 weeks.   -Awaiting labs 5/4/22.  -Follow up with PCP for positive LEEANN.   -Follow up with me in clinic in 1 month.     Thank you referring Mrs. Henao to clinic. Adequate time was dedicated to patient questions and answered to the expressed satisfaction of the patient.              Zari Roque, DO  Hematology/Oncology  [facility]

## 2022-05-04 NOTE — NURSING NOTE
"Oncology Rooming Note    May 4, 2022 1:15 PM   Allison Henao is a 42 year old female who presents for:    Chief Complaint   Patient presents with     Oncology Clinic Visit     Thrombocytopenia affecting pregnancy      Initial Vitals: /60   Pulse 81   Temp 98  F (36.7  C) (Tympanic)   Resp 16   Ht 1.543 m (5' 0.75\")   Wt 75.9 kg (167 lb 6.4 oz)   LMP 10/17/2021   SpO2 97%   BMI 31.89 kg/m   Estimated body mass index is 31.89 kg/m  as calculated from the following:    Height as of this encounter: 1.543 m (5' 0.75\").    Weight as of this encounter: 75.9 kg (167 lb 6.4 oz). Body surface area is 1.8 meters squared.  No Pain (0) Comment: Data Unavailable   Patient's last menstrual period was 10/17/2021.  Allergies reviewed: Yes  Medications reviewed: Yes    Medications: Medication refills not needed today.  Pharmacy name entered into [employer]: [id number] - [facility] [address] AT [place] [address]    Clinical concerns: follow up- would like to discuss iron supplement and vitamin b 12.      Adri Beck CMA              "

## 2022-05-04 NOTE — LETTER
2022         RE: Allison Henao  [id number] [facility] [address]        Dear Colleague,    Thank you for referring your patient, Allison Henao, to the [facility] [place]. Please see a copy of my visit note below.    [facility]    Hematology/Oncology Established Patient Follow-up Note    Treatment Summary:      Today's Date: 22    Reason for Follow-up: Thrombocytopenia affecting pregnancy  Referring Provider: Natalie Soliz MD      HISTORY OF PRESENT ILLNESS: Allison Henao is a  42 year old female who is referred for anemia and thrombocytopenia in pregnancy. She is currently 28 weeks gestation. Past medical history is significant for ovarian cyst, prediabetes, HLD. She is Tamazight-speaking and history is obtained via virtual .     She is originally from [facility] [place] and [place] the [place] in [place]. She has delivered her two children in [facility] [place] [facility] without CBC abnormality, both alive and well. Both were delivered vaginally without excessive bleed. [place], she has had one miscarriage in  at 6 weeks gestation. She had vaginal bleeding for 10 days.     Since , WBC has ranged 5.9-8.4, hemoglobin has decreased from 12.8 to 10.7 (MCV 80s), platelets have ranged 109-178. On 22, ferritin 10, TIBC 512, iron sat 13%. HIV negative. Hep B surface antigen negative. She takes iron and prenatal vitamin.     No epistaxis, gingival bleed, petechiae, hematuria/hematochezia/melena, or vaginal bleed. LMP was 2021. Planned for vaginal delivery 22.     She denies malar rash. She has intermittent arthralgias, but in the shoulders. 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       INTERIM HISTORY:  No epistaxis, gingival bleed, vaginal bleed, hematuria, hematochezia/melena. Dark stools with iron supplementation. Taking B12 daily at night.    She is planned for induction on 22 due to thrombocytopenia and  anemia.      REVIEW OF SYSTEMS:   A 14 point ROS was reviewed with pertinent positives and negatives in the HPI.       HOME MEDICATIONS:  Current Outpatient Medications   Medication Sig Dispense Refill     ferrous sulfate (FE TABS) 325 (65 Fe) MG EC tablet Take 325 mg by mouth 2 times daily       Prenatal Vit-Fe Fumarate-FA (PRENATAL VITAMIN) 27-0.8 MG TABS Take 1 tablet by mouth daily 90 tablet 3     cyanocobalamin (VITAMIN B-12) 1000 MCG tablet Take 1 tablet (1,000 mcg) by mouth daily (Patient not taking: Reported on 5/4/2022) 30 tablet 3         ALLERGIES:  No Known Allergies      PAST MEDICAL HISTORY:  Past Medical History:   Diagnosis Date     Ovarian cyst          PAST SURGICAL HISTORY:  Past Surgical History:   Procedure Laterality Date     ovarian cystectomy  09/21/2018         SOCIAL HISTORY:  Social History     Socioeconomic History     Marital status:      Spouse name: Not on file     Number of children: Not on file     Years of education: Not on file     Highest education level: Not on file   Occupational History     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, [place]   Tobacco Use     Smoking status: Never Smoker     Smokeless tobacco: Never Used   Vaping Use     Vaping Use: Never used   Substance and Sexual Activity     Alcohol use: Not Currently     Drug use: Never     Sexual activity: Yes     Partners: Male   Other Topics Concern     Not on file   Social History Narrative     Not on file     Social Determinants of Health     Financial Resource Strain: Not on file   Food Insecurity: Not on file   Transportation Needs: Not on file   Physical Activity: Not on file   Stress: Not on file   Social Connections: Not on file   Intimate Partner Violence: Not At Risk     Fear of Current or Ex-Partner: No     Emotionally Abused: No     Physically Abused: No     Sexually Abused: No   Housing Stability: Not on file         FAMILY HISTORY:  Family History   Problem Relation Age of Onset     Thyroid Disease Mother       "Pacemaker Mother      Pacemaker Father          PHYSICAL EXAM:  Vital signs:  /60   Pulse 81   Temp 98  F (36.7  C) (Tympanic)   Resp 16   Ht 1.543 m (5' 0.75\")   Wt 75.9 kg (167 lb 6.4 oz)   LMP 10/17/2021   SpO2 97%   BMI 31.89 kg/m     GENERAL/CONSTITUTIONAL: No acute distress.  EYES: Pupils are equal, round, and react to light and accommodation. Extraocular movements intact.  No scleral icterus.  ENT/MOUTH: Neck supple. Oropharynx clear, no mucositis.  LYMPH: No anterior cervical, posterior cervical, supraclavicular, axillary or inguinal adenopathy.   RESPIRATORY: Clear to auscultation bilaterally. No crackles or wheezing.   CARDIOVASCULAR: Regular rate and rhythm without murmurs, gallops, or rubs.  GASTROINTESTINAL: No hepatosplenomegaly, masses, or tenderness. The patient has normal bowel sounds. No guarding.  No distention. Gravid abdomen.  MUSCULOSKELETAL: Warm and well-perfused, no cyanosis, clubbing, or edema.  NEUROLOGIC: Cranial nerves II-XII are intact. Alert, oriented, answers questions appropriately.  INTEGUMENTARY: No rashes or jaundice.  GAIT: Steady, does not use assistive device      LABS:  CBC RESULTS:   Recent Labs   Lab Test 22  1539   WBC 7.6   RBC 3.67*   HGB 10.8*   HCT 33.2*   MCV 91   MCH 29.4   MCHC 32.5   RDW 14.0   *       Recent Labs   Lab Test 22  1602 22  1215    138   POTASSIUM 3.6 3.8   CHLORIDE 106 108   CO2 24 25   ANIONGAP 7 5   GLC 95 95   BUN 9 7   CR 0.39* 0.44*   DENITA 9.2 8.9         PATHOLOGY:  Final Diagnosis 3/28/22:   A.  Peripheral blood smear for morphology:  - Isolated, borderline thrombocytopenia.            IMAGING:  N/A     ASSESSMENT/PLAN:  Allison Henao is a  42 year old female who is referred for anemia and thrombocytopenia in pregnancy. She is currently 24 weeks gestation. Past medical history is significant for ovarian cyst, prediabetes, HLD. She is South African-speaking and history is obtained via virtual " . Induction is planned for 7/8/22.     Discussed with patient her past medical history, previous pregnancies, and current laboratory findings. She has evidence of iron deficiency anemia due to pregnancy and will supplement with venofer 300 mg weekly x3 weeks. She should continue daily iron, VitC, and prenatal vitamin.      For thrombocytopenia, differential includes gestational thrombocytopenia as platelets continue to be >100K. However, we discussed the possibility of ITP and need to monitor for HELLP/DIC. We discussed the possibility of steroid pulse or IVIG in the future.      Repeat CBC returned with PLT of 147K. WBC and Hb WNL. Renal function and LFTs WNL. LDH and haptoglobin WNL. Folate 36.1. B12 borderline at 227 and started on supplementation. Coag studies had slight elevation of PTT of 39, otherwise WNL. LEEANN returned 1:160. Peripheral smear with isolated, borderline thrombocytopenia. Otherwise, no other abnormalities.      -Patient's thrombocytopenia is most likely due to gestational thrombocytopenia (physiologic).   -Repeat labs every 2 weeks starting today.   -Continue once daily ferrous sulfate, VitC, B12 supplementation.  -As patient's Hb is now <11, will write for venofer 300 mg weekly x 3 weeks.   -Awaiting labs 5/4/22.  -Follow up with PCP for positive LEEANN.   -Follow up with me in clinic in 1 month.     Thank you referring Mrs. Henao to clinic. Adequate time was dedicated to patient questions and answered to the expressed satisfaction of the patient.              Zari Roque DO  Hematology/Oncology  [facility]            Again, thank you for allowing me to participate in the care of your patient.        Sincerely,        Zari Roque DO

## 2022-05-05 LAB
HAPTOGLOB SERPL-MCNC: 128 MG/DL (ref 32–197)
PATH REPORT.COMMENTS IMP SPEC: NORMAL
PATH REPORT.FINAL DX SPEC: NORMAL
PATH REPORT.MICROSCOPIC SPEC OTHER STN: NORMAL
PATH REPORT.MICROSCOPIC SPEC OTHER STN: NORMAL

## 2022-05-05 PROCEDURE — 85060 BLOOD SMEAR INTERPRETATION: CPT | Performed by: PATHOLOGY

## 2022-05-09 ENCOUNTER — INFUSION THERAPY VISIT (OUTPATIENT)
Dept: INFUSION THERAPY | Facility: CLINIC | Age: 42
End: 2022-05-09
Attending: INTERNAL MEDICINE
Payer: COMMERCIAL

## 2022-05-09 VITALS
RESPIRATION RATE: 16 BRPM | TEMPERATURE: 97.8 F | OXYGEN SATURATION: 96 % | HEART RATE: 83 BPM | SYSTOLIC BLOOD PRESSURE: 104 MMHG | DIASTOLIC BLOOD PRESSURE: 68 MMHG

## 2022-05-09 DIAGNOSIS — D50.0 IRON DEFICIENCY ANEMIA DUE TO CHRONIC BLOOD LOSS: Primary | ICD-10-CM

## 2022-05-09 DIAGNOSIS — D50.0 IRON DEFICIENCY ANEMIA DUE TO CHRONIC BLOOD LOSS: ICD-10-CM

## 2022-05-09 PROCEDURE — 96365 THER/PROPH/DIAG IV INF INIT: CPT

## 2022-05-09 PROCEDURE — 258N000003 HC RX IP 258 OP 636: Performed by: INTERNAL MEDICINE

## 2022-05-09 PROCEDURE — 250N000011 HC RX IP 250 OP 636: Performed by: INTERNAL MEDICINE

## 2022-05-09 PROCEDURE — 96366 THER/PROPH/DIAG IV INF ADDON: CPT

## 2022-05-09 RX ORDER — ALBUTEROL SULFATE 0.83 MG/ML
2.5 SOLUTION RESPIRATORY (INHALATION)
Status: CANCELLED | OUTPATIENT
Start: 2022-05-16

## 2022-05-09 RX ORDER — METHYLPREDNISOLONE SODIUM SUCCINATE 125 MG/2ML
125 INJECTION, POWDER, LYOPHILIZED, FOR SOLUTION INTRAMUSCULAR; INTRAVENOUS
Status: CANCELLED
Start: 2022-05-16

## 2022-05-09 RX ORDER — ALBUTEROL SULFATE 90 UG/1
1-2 AEROSOL, METERED RESPIRATORY (INHALATION)
Status: CANCELLED
Start: 2022-05-16

## 2022-05-09 RX ORDER — HEPARIN SODIUM (PORCINE) LOCK FLUSH IV SOLN 100 UNIT/ML 100 UNIT/ML
5 SOLUTION INTRAVENOUS
Status: CANCELLED | OUTPATIENT
Start: 2022-05-16

## 2022-05-09 RX ORDER — HEPARIN SODIUM,PORCINE 10 UNIT/ML
5 VIAL (ML) INTRAVENOUS
Status: CANCELLED | OUTPATIENT
Start: 2022-05-16

## 2022-05-09 RX ORDER — DIPHENHYDRAMINE HYDROCHLORIDE 50 MG/ML
50 INJECTION INTRAMUSCULAR; INTRAVENOUS
Status: CANCELLED
Start: 2022-05-16

## 2022-05-09 RX ORDER — NALOXONE HYDROCHLORIDE 0.4 MG/ML
0.2 INJECTION, SOLUTION INTRAMUSCULAR; INTRAVENOUS; SUBCUTANEOUS
Status: CANCELLED | OUTPATIENT
Start: 2022-05-16

## 2022-05-09 RX ORDER — MEPERIDINE HYDROCHLORIDE 25 MG/ML
25 INJECTION INTRAMUSCULAR; INTRAVENOUS; SUBCUTANEOUS EVERY 30 MIN PRN
Status: CANCELLED | OUTPATIENT
Start: 2022-05-16

## 2022-05-09 RX ORDER — EPINEPHRINE 1 MG/ML
0.3 INJECTION, SOLUTION INTRAMUSCULAR; SUBCUTANEOUS EVERY 5 MIN PRN
Status: CANCELLED | OUTPATIENT
Start: 2022-05-16

## 2022-05-09 RX ADMIN — IRON SUCROSE 300 MG: 20 INJECTION, SOLUTION INTRAVENOUS at 14:41

## 2022-05-09 NOTE — PROGRESS NOTES
Infusion Nursing Note:  Allison Henao presents today for venofer dose 1 of 3.    Patient seen by provider today: No   present during visit today: Yes, Language: per phone, Russian.     Note: provided pt with Shitalmes information on Venofer, written in Russian.  Per phone , explained use of iron infusion, potential side effects, and need for 3 doses.  Pt asked pertinent questions and indicated she felt comfortable with answers    Requested to have next 2 appts changed to 1430.  She states she can be here on time and she has good veins for easy IV insertion.  OK with charge RN that we can change the appts to 1430. If she is late, we will need to change 3rd infusion back to 1400.    Intravenous Access:  Peripheral IV placed.    Treatment Conditions:  Not Applicable.      Post Infusion Assessment:  Patient tolerated infusion without incident.  Blood return noted pre and post infusion.  Site patent and intact, free from redness, edema or discomfort.  No evidence of extravasations.  Access discontinued per protocol.       Discharge Plan:   Discharge instructions reviewed with: Patient.  Patient and/or family verbalized understanding of discharge instructions and all questions answered.  AVS to patient via [employer].  Patient will return 5/16 for next appointment.   Patient discharged in stable condition accompanied by: self.  Departure Mode: Ambulatory.      Tanvi Kunz RN

## 2022-05-10 ENCOUNTER — PRENATAL OFFICE VISIT (OUTPATIENT)
Dept: OBGYN | Facility: CLINIC | Age: 42
End: 2022-05-10
Payer: COMMERCIAL

## 2022-05-10 VITALS — WEIGHT: 169.3 LBS | BODY MASS INDEX: 32.25 KG/M2 | DIASTOLIC BLOOD PRESSURE: 58 MMHG | SYSTOLIC BLOOD PRESSURE: 110 MMHG

## 2022-05-10 DIAGNOSIS — O09.529 SUPERVISION OF HIGH-RISK PREGNANCY OF ELDERLY MULTIGRAVIDA: Primary | ICD-10-CM

## 2022-05-10 PROCEDURE — 99207 PR PRENATAL VISIT: CPT | Performed by: OBSTETRICS & GYNECOLOGY

## 2022-05-10 NOTE — PROGRESS NOTES
Routine obstetric visit at 29w2d  Estimated Date of Delivery: Jul 24, 2022     Issues:  1. PNC: Tdap today  2. AMA: level II within normal limits other than circumvallate placenta  3. Thrombocytopenia: s/p Heme consult, ITP vs gest thrombocytopenia. Recheck today and then at least monthly.   4. Circumvallate placenta: repeat growth 4/12 within normal limits, follow up scheduled in [facility] [place].  5. Positive LEEANN titer 1:160. Discussed with [facility] [place], keep growth US as scheduled, no other follow up needed.  6. Mild anemia, on iron.  7. AMA: plan 39w IOL      Natalie Soliz MD

## 2022-05-10 NOTE — LETTER
[facility] [place] [address]  [address]  [facility] [address]  Phone: [phone]  Fax: [phone]    05/10/22    Allison Henao  [address] [facility] [address]      To whom it may concern:     Allison Henao is a patient under my care for her pregnancy, her  Juan Joseph was absent from work to attend a doctors appointment with her today. He will return to work as scheduled after that.       Any questions please contact our clinic at [phone].    Sincerely,      Natalie Soliz MD

## 2022-05-10 NOTE — PROGRESS NOTES
Routine obstetric visit at 27w2d  Estimated Date of Delivery: Jul 24, 2022     Issues:  1. PNC: cbc, RPR, one hr GCT today. Tdap: next visit   2. AMA: level II within normal limits other than circumvallate placenta  3. Thrombocytopenia: s/p Heme consult, ITP vs gest thrombocytopenia. Recheck today and then at least monthly.   4. Circumvallate placenta: repeat growth 4/12 within normal limits, follow up scheduled in [facility].  5. Positive LEEANN titer 1:160. Discussed with M, keep growth US as scheduled, no other follow up needed.    This week, urinating more often. Then has urgency right after that. Will check UA.     Natalie Soliz MD

## 2022-05-10 NOTE — NURSING NOTE
"Chief Complaint   Patient presents with     Prenatal Care     29 weeks 2 days, no c/o VB, LoF. Has some minor cramping. Feeling FM several times a day. No questions or concerns today.          Initial /58   Wt 76.8 kg (169 lb 4.8 oz)   LMP 10/17/2021   BMI 32.25 kg/m   Estimated body mass index is 32.25 kg/m  as calculated from the following:    Height as of 22: 1.543 m (5' 0.75\").    Weight as of this encounter: 76.8 kg (169 lb 4.8 oz).  BP completed using cuff size: regular    Questioned patient about current smoking habits.  Pt. has never smoked.          The following HM Due: NONE    Janelle Ramsey CMA               "

## 2022-05-11 NOTE — PATIENT INSTRUCTIONS
Allison is scheduled for venofer on 05/09/22, 05/16/22, 05/23/22 and a return with Dr. Roque on 06/01/22 at 1:00 pm.     Dot Villanueva RN on 5/11/2022 at 12:27 PM

## 2022-05-16 ENCOUNTER — INFUSION THERAPY VISIT (OUTPATIENT)
Dept: INFUSION THERAPY | Facility: CLINIC | Age: 42
End: 2022-05-16
Attending: INTERNAL MEDICINE
Payer: COMMERCIAL

## 2022-05-16 VITALS
RESPIRATION RATE: 16 BRPM | OXYGEN SATURATION: 98 % | DIASTOLIC BLOOD PRESSURE: 70 MMHG | HEART RATE: 83 BPM | SYSTOLIC BLOOD PRESSURE: 113 MMHG | TEMPERATURE: 98.4 F

## 2022-05-16 DIAGNOSIS — D50.0 IRON DEFICIENCY ANEMIA DUE TO CHRONIC BLOOD LOSS: ICD-10-CM

## 2022-05-16 DIAGNOSIS — O99.119 THROMBOCYTOPENIA AFFECTING PREGNANCY (H): ICD-10-CM

## 2022-05-16 DIAGNOSIS — D69.6 THROMBOCYTOPENIA AFFECTING PREGNANCY (H): ICD-10-CM

## 2022-05-16 DIAGNOSIS — D50.0 IRON DEFICIENCY ANEMIA DUE TO CHRONIC BLOOD LOSS: Primary | ICD-10-CM

## 2022-05-16 LAB
ALBUMIN SERPL-MCNC: 2.6 G/DL (ref 3.4–5)
ALP SERPL-CCNC: 122 U/L (ref 40–150)
ALT SERPL W P-5'-P-CCNC: 23 U/L (ref 0–50)
ANION GAP SERPL CALCULATED.3IONS-SCNC: 8 MMOL/L (ref 3–14)
APTT PPP: 34 SECONDS (ref 22–38)
AST SERPL W P-5'-P-CCNC: 15 U/L (ref 0–45)
BASOPHILS # BLD AUTO: 0 10E3/UL (ref 0–0.2)
BASOPHILS NFR BLD AUTO: 0 %
BILIRUB SERPL-MCNC: 0.2 MG/DL (ref 0.2–1.3)
BUN SERPL-MCNC: 10 MG/DL (ref 7–30)
CALCIUM SERPL-MCNC: 8.6 MG/DL (ref 8.5–10.1)
CHLORIDE BLD-SCNC: 108 MMOL/L (ref 94–109)
CO2 SERPL-SCNC: 22 MMOL/L (ref 20–32)
CREAT SERPL-MCNC: 0.33 MG/DL (ref 0.52–1.04)
EOSINOPHIL # BLD AUTO: 0 10E3/UL (ref 0–0.7)
EOSINOPHIL NFR BLD AUTO: 0 %
ERYTHROCYTE [DISTWIDTH] IN BLOOD BY AUTOMATED COUNT: 13.2 % (ref 10–15)
FIBRINOGEN PPP-MCNC: 437 MG/DL (ref 170–490)
GFR SERPL CREATININE-BSD FRML MDRD: >90 ML/MIN/1.73M2
GLUCOSE BLD-MCNC: 105 MG/DL (ref 70–99)
HCT VFR BLD AUTO: 35.3 % (ref 35–47)
HGB BLD-MCNC: 11.4 G/DL (ref 11.7–15.7)
IMM GRANULOCYTES # BLD: 0 10E3/UL
IMM GRANULOCYTES NFR BLD: 1 %
INR PPP: 0.98 (ref 0.85–1.15)
LDH SERPL L TO P-CCNC: 160 U/L (ref 81–234)
LYMPHOCYTES # BLD AUTO: 1.2 10E3/UL (ref 0.8–5.3)
LYMPHOCYTES NFR BLD AUTO: 18 %
MCH RBC QN AUTO: 29.5 PG (ref 26.5–33)
MCHC RBC AUTO-ENTMCNC: 32.3 G/DL (ref 31.5–36.5)
MCV RBC AUTO: 91 FL (ref 78–100)
MONOCYTES # BLD AUTO: 0.4 10E3/UL (ref 0–1.3)
MONOCYTES NFR BLD AUTO: 6 %
NEUTROPHILS # BLD AUTO: 5.1 10E3/UL (ref 1.6–8.3)
NEUTROPHILS NFR BLD AUTO: 75 %
NRBC # BLD AUTO: 0 10E3/UL
NRBC BLD AUTO-RTO: 0 /100
PLATELET # BLD AUTO: 139 10E3/UL (ref 150–450)
POTASSIUM BLD-SCNC: 3.5 MMOL/L (ref 3.4–5.3)
PROT SERPL-MCNC: 6.5 G/DL (ref 6.8–8.8)
RBC # BLD AUTO: 3.87 10E6/UL (ref 3.8–5.2)
RETICS # AUTO: 0.1 10E6/UL (ref 0.03–0.1)
RETICS/RBC NFR AUTO: 2.6 % (ref 0.5–2)
SODIUM SERPL-SCNC: 138 MMOL/L (ref 133–144)
WBC # BLD AUTO: 6.8 10E3/UL (ref 4–11)

## 2022-05-16 PROCEDURE — 85384 FIBRINOGEN ACTIVITY: CPT | Performed by: INTERNAL MEDICINE

## 2022-05-16 PROCEDURE — 83010 ASSAY OF HAPTOGLOBIN QUANT: CPT | Performed by: INTERNAL MEDICINE

## 2022-05-16 PROCEDURE — 85730 THROMBOPLASTIN TIME PARTIAL: CPT | Performed by: INTERNAL MEDICINE

## 2022-05-16 PROCEDURE — 85610 PROTHROMBIN TIME: CPT | Performed by: INTERNAL MEDICINE

## 2022-05-16 PROCEDURE — 250N000011 HC RX IP 250 OP 636: Performed by: INTERNAL MEDICINE

## 2022-05-16 PROCEDURE — 85045 AUTOMATED RETICULOCYTE COUNT: CPT | Performed by: INTERNAL MEDICINE

## 2022-05-16 PROCEDURE — 96365 THER/PROPH/DIAG IV INF INIT: CPT

## 2022-05-16 PROCEDURE — 36415 COLL VENOUS BLD VENIPUNCTURE: CPT

## 2022-05-16 PROCEDURE — 80053 COMPREHEN METABOLIC PANEL: CPT | Performed by: INTERNAL MEDICINE

## 2022-05-16 PROCEDURE — 85025 COMPLETE CBC W/AUTO DIFF WBC: CPT | Performed by: INTERNAL MEDICINE

## 2022-05-16 PROCEDURE — 83615 LACTATE (LD) (LDH) ENZYME: CPT | Performed by: INTERNAL MEDICINE

## 2022-05-16 PROCEDURE — 258N000003 HC RX IP 258 OP 636: Performed by: INTERNAL MEDICINE

## 2022-05-16 RX ORDER — ALBUTEROL SULFATE 90 UG/1
1-2 AEROSOL, METERED RESPIRATORY (INHALATION)
Status: CANCELLED
Start: 2022-05-23

## 2022-05-16 RX ORDER — NALOXONE HYDROCHLORIDE 0.4 MG/ML
0.2 INJECTION, SOLUTION INTRAMUSCULAR; INTRAVENOUS; SUBCUTANEOUS
Status: CANCELLED | OUTPATIENT
Start: 2022-05-23

## 2022-05-16 RX ORDER — HEPARIN SODIUM,PORCINE 10 UNIT/ML
5 VIAL (ML) INTRAVENOUS
Status: CANCELLED | OUTPATIENT
Start: 2022-05-23

## 2022-05-16 RX ORDER — EPINEPHRINE 1 MG/ML
0.3 INJECTION, SOLUTION INTRAMUSCULAR; SUBCUTANEOUS EVERY 5 MIN PRN
Status: CANCELLED | OUTPATIENT
Start: 2022-05-23

## 2022-05-16 RX ORDER — METHYLPREDNISOLONE SODIUM SUCCINATE 125 MG/2ML
125 INJECTION, POWDER, LYOPHILIZED, FOR SOLUTION INTRAMUSCULAR; INTRAVENOUS
Status: CANCELLED
Start: 2022-05-23

## 2022-05-16 RX ORDER — MEPERIDINE HYDROCHLORIDE 25 MG/ML
25 INJECTION INTRAMUSCULAR; INTRAVENOUS; SUBCUTANEOUS EVERY 30 MIN PRN
Status: CANCELLED | OUTPATIENT
Start: 2022-05-23

## 2022-05-16 RX ORDER — HEPARIN SODIUM (PORCINE) LOCK FLUSH IV SOLN 100 UNIT/ML 100 UNIT/ML
5 SOLUTION INTRAVENOUS
Status: CANCELLED | OUTPATIENT
Start: 2022-05-23

## 2022-05-16 RX ORDER — DIPHENHYDRAMINE HYDROCHLORIDE 50 MG/ML
50 INJECTION INTRAMUSCULAR; INTRAVENOUS
Status: CANCELLED
Start: 2022-05-23

## 2022-05-16 RX ORDER — ALBUTEROL SULFATE 0.83 MG/ML
2.5 SOLUTION RESPIRATORY (INHALATION)
Status: CANCELLED | OUTPATIENT
Start: 2022-05-23

## 2022-05-16 RX ADMIN — IRON SUCROSE 300 MG: 20 INJECTION, SOLUTION INTRAVENOUS at 14:41

## 2022-05-16 NOTE — PROGRESS NOTES
Infusion Nursing Note:  Allison Henao presents today for Venofer #2 of 3.    Patient seen by provider today: No   present during visit today: Not Applicable.      Note: Allison reports no issues with previous Venofer infusion.  No new complaints or concerns.    Utilized Greek video  for communication during visit.      Intravenous Access:  Labs drawn without difficulty.  Peripheral IV placed.      Treatment Conditions:  Not Applicable.      Post Infusion Assessment:  Patient tolerated infusion without incident.  Blood return noted pre and post infusion.  Site patent and intact, free from redness, edema or discomfort.  No evidence of extravasations.  Access discontinued per protocol.       Discharge Plan:   Discharge instructions reviewed with: Patient.  Patient and/or family verbalized understanding of discharge instructions and all questions answered.  Copy of AVS reviewed with patient and/or family.  Patient will return 5/23 for next appointment.  Patient discharged in stable condition accompanied by: self.  Departure Mode: Ambulatory.    Elías Matos RN  
[facility]...

## 2022-05-17 LAB — HAPTOGLOB SERPL-MCNC: 115 MG/DL (ref 32–197)

## 2022-05-18 LAB
PATH REPORT.COMMENTS IMP SPEC: NORMAL
PATH REPORT.COMMENTS IMP SPEC: NORMAL
PATH REPORT.FINAL DX SPEC: NORMAL
PATH REPORT.MICROSCOPIC SPEC OTHER STN: NORMAL
PATH REPORT.MICROSCOPIC SPEC OTHER STN: NORMAL
PATH REPORT.RELEVANT HX SPEC: NORMAL

## 2022-05-18 PROCEDURE — 85060 BLOOD SMEAR INTERPRETATION: CPT | Performed by: PATHOLOGY

## 2022-05-23 ENCOUNTER — INFUSION THERAPY VISIT (OUTPATIENT)
Dept: INFUSION THERAPY | Facility: CLINIC | Age: 42
End: 2022-05-23
Attending: INTERNAL MEDICINE
Payer: COMMERCIAL

## 2022-05-23 VITALS
DIASTOLIC BLOOD PRESSURE: 69 MMHG | TEMPERATURE: 98.5 F | HEART RATE: 81 BPM | SYSTOLIC BLOOD PRESSURE: 113 MMHG | OXYGEN SATURATION: 97 % | RESPIRATION RATE: 16 BRPM

## 2022-05-23 DIAGNOSIS — D50.0 IRON DEFICIENCY ANEMIA DUE TO CHRONIC BLOOD LOSS: ICD-10-CM

## 2022-05-23 DIAGNOSIS — D50.0 IRON DEFICIENCY ANEMIA DUE TO CHRONIC BLOOD LOSS: Primary | ICD-10-CM

## 2022-05-23 PROCEDURE — 258N000003 HC RX IP 258 OP 636: Performed by: INTERNAL MEDICINE

## 2022-05-23 PROCEDURE — 96366 THER/PROPH/DIAG IV INF ADDON: CPT

## 2022-05-23 PROCEDURE — 96365 THER/PROPH/DIAG IV INF INIT: CPT

## 2022-05-23 PROCEDURE — 250N000011 HC RX IP 250 OP 636: Performed by: INTERNAL MEDICINE

## 2022-05-23 RX ORDER — DIPHENHYDRAMINE HYDROCHLORIDE 50 MG/ML
50 INJECTION INTRAMUSCULAR; INTRAVENOUS
Status: CANCELLED
Start: 2022-05-23

## 2022-05-23 RX ORDER — MEPERIDINE HYDROCHLORIDE 25 MG/ML
25 INJECTION INTRAMUSCULAR; INTRAVENOUS; SUBCUTANEOUS EVERY 30 MIN PRN
Status: CANCELLED | OUTPATIENT
Start: 2022-05-23

## 2022-05-23 RX ORDER — EPINEPHRINE 1 MG/ML
0.3 INJECTION, SOLUTION INTRAMUSCULAR; SUBCUTANEOUS EVERY 5 MIN PRN
Status: CANCELLED | OUTPATIENT
Start: 2022-05-23

## 2022-05-23 RX ORDER — HEPARIN SODIUM,PORCINE 10 UNIT/ML
5 VIAL (ML) INTRAVENOUS
Status: CANCELLED | OUTPATIENT
Start: 2022-05-23

## 2022-05-23 RX ORDER — ALBUTEROL SULFATE 0.83 MG/ML
2.5 SOLUTION RESPIRATORY (INHALATION)
Status: CANCELLED | OUTPATIENT
Start: 2022-05-23

## 2022-05-23 RX ORDER — METHYLPREDNISOLONE SODIUM SUCCINATE 125 MG/2ML
125 INJECTION, POWDER, LYOPHILIZED, FOR SOLUTION INTRAMUSCULAR; INTRAVENOUS
Status: CANCELLED
Start: 2022-05-23

## 2022-05-23 RX ORDER — ALBUTEROL SULFATE 90 UG/1
1-2 AEROSOL, METERED RESPIRATORY (INHALATION)
Status: CANCELLED
Start: 2022-05-23

## 2022-05-23 RX ORDER — NALOXONE HYDROCHLORIDE 0.4 MG/ML
0.2 INJECTION, SOLUTION INTRAMUSCULAR; INTRAVENOUS; SUBCUTANEOUS
Status: CANCELLED | OUTPATIENT
Start: 2022-05-23

## 2022-05-23 RX ORDER — HEPARIN SODIUM (PORCINE) LOCK FLUSH IV SOLN 100 UNIT/ML 100 UNIT/ML
5 SOLUTION INTRAVENOUS
Status: CANCELLED | OUTPATIENT
Start: 2022-05-23

## 2022-05-23 RX ADMIN — IRON SUCROSE 300 MG: 20 INJECTION, SOLUTION INTRAVENOUS at 14:36

## 2022-05-23 NOTE — PROGRESS NOTES
Infusion Nursing Note:  Allison Henao presents today for Venofer: Dose 3 of 3.    Patient seen by provider today: No   present during visit today: Yes, Language: Turkmen.     Note: Tolerating iron infusion. Confirmed with patient follow-up appt on 6/1 with Dr Roque.      Intravenous Access:  Peripheral IV placed.    Treatment Conditions:  Not Applicable.      Post Infusion Assessment:  Patient tolerated infusion without incident.  Blood return noted pre and post infusion.  Site patent and intact, free from redness, edema or discomfort.  No evidence of extravasations.  Access discontinued per protocol.       Discharge Plan:   [facility]  Patient will return 6/1 for next appointment.   Patient discharged in stable condition accompanied by: self.  Departure Mode: Ambulatory.      Pawel Dan RN

## 2022-05-24 ENCOUNTER — HOSPITAL ENCOUNTER (OUTPATIENT)
Dept: ULTRASOUND IMAGING | Facility: CLINIC | Age: 42
Discharge: HOME | End: 2022-05-24
Attending: OBSTETRICS & GYNECOLOGY
Payer: COMMERCIAL

## 2022-05-24 ENCOUNTER — OFFICE VISIT (OUTPATIENT)
Dept: MATERNAL FETAL MEDICINE | Facility: CLINIC | Age: 42
End: 2022-05-24
Attending: OBSTETRICS & GYNECOLOGY
Payer: COMMERCIAL

## 2022-05-24 ENCOUNTER — PRENATAL OFFICE VISIT (OUTPATIENT)
Dept: OBGYN | Facility: CLINIC | Age: 42
End: 2022-05-24

## 2022-05-24 VITALS — SYSTOLIC BLOOD PRESSURE: 112 MMHG | WEIGHT: 171 LBS | BODY MASS INDEX: 32.58 KG/M2 | DIASTOLIC BLOOD PRESSURE: 60 MMHG

## 2022-05-24 DIAGNOSIS — O09.529 ANTEPARTUM MULTIGRAVIDA OF ADVANCED MATERNAL AGE: Primary | ICD-10-CM

## 2022-05-24 DIAGNOSIS — O43.119 CIRCUMVALLATE PLACENTA, UNSPECIFIED TRIMESTER: ICD-10-CM

## 2022-05-24 DIAGNOSIS — O09.899 SUPERVISION OF OTHER HIGH RISK PREGNANCY, ANTEPARTUM: ICD-10-CM

## 2022-05-24 DIAGNOSIS — O09.522 AMA (ADVANCED MATERNAL AGE) MULTIGRAVIDA 35+, SECOND TRIMESTER: Primary | ICD-10-CM

## 2022-05-24 DIAGNOSIS — O43.119 ANTEPARTUM PLACENTA CIRCUMVALLATA: ICD-10-CM

## 2022-05-24 PROCEDURE — 90471 IMMUNIZATION ADMIN: CPT | Performed by: OBSTETRICS & GYNECOLOGY

## 2022-05-24 PROCEDURE — 76816 OB US FOLLOW-UP PER FETUS: CPT | Mod: 26 | Performed by: OBSTETRICS & GYNECOLOGY

## 2022-05-24 PROCEDURE — 90715 TDAP VACCINE 7 YRS/> IM: CPT | Performed by: OBSTETRICS & GYNECOLOGY

## 2022-05-24 PROCEDURE — 99207 PR PRENATAL VISIT: CPT | Performed by: OBSTETRICS & GYNECOLOGY

## 2022-05-24 PROCEDURE — 76816 OB US FOLLOW-UP PER FETUS: CPT

## 2022-05-24 NOTE — PATIENT INSTRUCTIONS
You can reach your [facility] Care Team any time of the day by calling [phone]. This number will put you in touch with the 24 hour nurse line if the clinic is closed.    To contact your OB/GYN Station Coordinator/Surgery Scheduler please call [phone]. This is a direct number for your care team between 8 a.m. and 4 p.m. Monday through Friday.    [facility] is open for your convenience:  Monday through Friday 8 a.m. to 6 p.m.  Closed weekends and all major holidays.

## 2022-05-24 NOTE — PROGRESS NOTES
"Please see \"Imaging\" tab under \"Chart Review\" for details of today's US at the [facility].    Boris Lloyd MD  Maternal-Fetal Medicine    "

## 2022-05-24 NOTE — NURSING NOTE
"Chief Complaint   Patient presents with     Prenatal Care       Initial /60   Wt 77.6 kg (171 lb)   LMP 10/17/2021   BMI 32.58 kg/m   Estimated body mass index is 32.58 kg/m  as calculated from the following:    Height as of 22: 1.543 m (5' 0.75\").    Weight as of this encounter: 77.6 kg (171 lb).  BP completed using cuff size: regular    Questioned patient about current smoking habits.  Pt. has never smoked.          31w2d        Edna Toure, [facility]       "

## 2022-05-25 PROBLEM — O09.899 SUPERVISION OF OTHER HIGH RISK PREGNANCY, ANTEPARTUM: Status: ACTIVE | Noted: 2022-05-25

## 2022-05-25 PROBLEM — O09.529 ANTEPARTUM MULTIGRAVIDA OF ADVANCED MATERNAL AGE: Status: ACTIVE | Noted: 2022-05-25

## 2022-05-25 PROBLEM — O43.119 ANTEPARTUM PLACENTA CIRCUMVALLATA: Status: ACTIVE | Noted: 2022-05-25

## 2022-05-25 NOTE — PROGRESS NOTES
Allison Henao is a 42 year old female, 31w3d, Estimated Date of Delivery: 2022 who presents for prenatal care.  Good fetal movement.  Patient is otherwise doing well without concerns.  Plan for delivery by 39 weeks gestation also reviewed.  Previous ultrasounds and circumvallate placenta findings reviewed    A/P: Intrauterine pregnancy 31-2/7 weeks gestation doing well.  Signs and symptoms of  labor and concern discussed the patient will call.  Today's exam was done with the aid of the   I discussed with the patient the option of weekly appointments versus every other week appointment until 36 weeks.  In light of her AMA and placental abnormality we decided on weekly appointments

## 2022-06-01 ENCOUNTER — LAB (OUTPATIENT)
Dept: ONCOLOGY | Facility: CLINIC | Age: 42
End: 2022-06-01
Attending: INTERNAL MEDICINE
Payer: COMMERCIAL

## 2022-06-01 VITALS
BODY MASS INDEX: 32.69 KG/M2 | SYSTOLIC BLOOD PRESSURE: 106 MMHG | DIASTOLIC BLOOD PRESSURE: 61 MMHG | OXYGEN SATURATION: 98 % | WEIGHT: 171.6 LBS | HEART RATE: 72 BPM | RESPIRATION RATE: 16 BRPM | TEMPERATURE: 98.3 F

## 2022-06-01 DIAGNOSIS — O99.119 THROMBOCYTOPENIA AFFECTING PREGNANCY (H): ICD-10-CM

## 2022-06-01 DIAGNOSIS — E53.8 VITAMIN B12 DEFICIENCY (NON ANEMIC): ICD-10-CM

## 2022-06-01 DIAGNOSIS — D69.6 THROMBOCYTOPENIA AFFECTING PREGNANCY (H): ICD-10-CM

## 2022-06-01 DIAGNOSIS — D50.0 IRON DEFICIENCY ANEMIA DUE TO CHRONIC BLOOD LOSS: ICD-10-CM

## 2022-06-01 LAB
ALBUMIN SERPL-MCNC: 2.4 G/DL (ref 3.4–5)
ALP SERPL-CCNC: 130 U/L (ref 40–150)
ALT SERPL W P-5'-P-CCNC: 24 U/L (ref 0–50)
ANION GAP SERPL CALCULATED.3IONS-SCNC: 9 MMOL/L (ref 3–14)
APTT PPP: 35 SECONDS (ref 22–38)
AST SERPL W P-5'-P-CCNC: 19 U/L (ref 0–45)
BASOPHILS # BLD AUTO: 0 10E3/UL (ref 0–0.2)
BASOPHILS NFR BLD AUTO: 0 %
BILIRUB SERPL-MCNC: 0.4 MG/DL (ref 0.2–1.3)
BUN SERPL-MCNC: 6 MG/DL (ref 7–30)
CALCIUM SERPL-MCNC: 8.4 MG/DL (ref 8.5–10.1)
CHLORIDE BLD-SCNC: 108 MMOL/L (ref 94–109)
CO2 SERPL-SCNC: 23 MMOL/L (ref 20–32)
CREAT SERPL-MCNC: 0.32 MG/DL (ref 0.52–1.04)
EOSINOPHIL # BLD AUTO: 0 10E3/UL (ref 0–0.7)
EOSINOPHIL NFR BLD AUTO: 1 %
ERYTHROCYTE [DISTWIDTH] IN BLOOD BY AUTOMATED COUNT: 13.9 % (ref 10–15)
FIBRINOGEN PPP-MCNC: 383 MG/DL (ref 170–490)
GFR SERPL CREATININE-BSD FRML MDRD: >90 ML/MIN/1.73M2
GLUCOSE BLD-MCNC: 113 MG/DL (ref 70–99)
HCT VFR BLD AUTO: 33 % (ref 35–47)
HGB BLD-MCNC: 10.8 G/DL (ref 11.7–15.7)
IMM GRANULOCYTES # BLD: 0 10E3/UL
IMM GRANULOCYTES NFR BLD: 1 %
INR PPP: 1 (ref 0.85–1.15)
LDH SERPL L TO P-CCNC: 154 U/L (ref 81–234)
LYMPHOCYTES # BLD AUTO: 1 10E3/UL (ref 0.8–5.3)
LYMPHOCYTES NFR BLD AUTO: 18 %
MCH RBC QN AUTO: 30 PG (ref 26.5–33)
MCHC RBC AUTO-ENTMCNC: 32.7 G/DL (ref 31.5–36.5)
MCV RBC AUTO: 92 FL (ref 78–100)
MONOCYTES # BLD AUTO: 0.3 10E3/UL (ref 0–1.3)
MONOCYTES NFR BLD AUTO: 6 %
NEUTROPHILS # BLD AUTO: 4.5 10E3/UL (ref 1.6–8.3)
NEUTROPHILS NFR BLD AUTO: 74 %
NRBC # BLD AUTO: 0 10E3/UL
NRBC BLD AUTO-RTO: 0 /100
PLATELET # BLD AUTO: 106 10E3/UL (ref 150–450)
POTASSIUM BLD-SCNC: 3.2 MMOL/L (ref 3.4–5.3)
PROT SERPL-MCNC: 6 G/DL (ref 6.8–8.8)
RBC # BLD AUTO: 3.6 10E6/UL (ref 3.8–5.2)
RETICS # AUTO: 0.1 10E6/UL (ref 0.03–0.1)
RETICS/RBC NFR AUTO: 2.7 % (ref 0.5–2)
SODIUM SERPL-SCNC: 140 MMOL/L (ref 133–144)
WBC # BLD AUTO: 6 10E3/UL (ref 4–11)

## 2022-06-01 PROCEDURE — 85025 COMPLETE CBC W/AUTO DIFF WBC: CPT | Performed by: INTERNAL MEDICINE

## 2022-06-01 PROCEDURE — 85730 THROMBOPLASTIN TIME PARTIAL: CPT | Performed by: INTERNAL MEDICINE

## 2022-06-01 PROCEDURE — 36415 COLL VENOUS BLD VENIPUNCTURE: CPT

## 2022-06-01 PROCEDURE — 85045 AUTOMATED RETICULOCYTE COUNT: CPT | Performed by: INTERNAL MEDICINE

## 2022-06-01 PROCEDURE — 85384 FIBRINOGEN ACTIVITY: CPT | Performed by: INTERNAL MEDICINE

## 2022-06-01 PROCEDURE — G0463 HOSPITAL OUTPT CLINIC VISIT: HCPCS

## 2022-06-01 PROCEDURE — 99215 OFFICE O/P EST HI 40 MIN: CPT | Performed by: INTERNAL MEDICINE

## 2022-06-01 PROCEDURE — 83615 LACTATE (LD) (LDH) ENZYME: CPT | Performed by: INTERNAL MEDICINE

## 2022-06-01 PROCEDURE — 85060 BLOOD SMEAR INTERPRETATION: CPT | Performed by: PATHOLOGY

## 2022-06-01 PROCEDURE — 80053 COMPREHEN METABOLIC PANEL: CPT | Performed by: INTERNAL MEDICINE

## 2022-06-01 PROCEDURE — 83010 ASSAY OF HAPTOGLOBIN QUANT: CPT | Performed by: INTERNAL MEDICINE

## 2022-06-01 PROCEDURE — 85610 PROTHROMBIN TIME: CPT | Performed by: INTERNAL MEDICINE

## 2022-06-01 ASSESSMENT — PAIN SCALES - GENERAL: PAINLEVEL: NO PAIN (0)

## 2022-06-01 NOTE — PROGRESS NOTES
"Oncology Rooming Note    June 1, 2022 12:50 PM   Allison Henao is a 42 year old female who presents for:    Chief Complaint   Patient presents with     Oncology Clinic Visit     Thrombocytopenia affecting pregnancy     Initial Vitals: /61   Pulse 72   Temp 98.3  F (36.8  C) (Tympanic)   Resp 16   Wt 77.8 kg (171 lb 9.6 oz)   LMP 10/17/2021   SpO2 98%   BMI 32.69 kg/m   Estimated body mass index is 32.69 kg/m  as calculated from the following:    Height as of 5/4/22: 1.543 m (5' 0.75\").    Weight as of this encounter: 77.8 kg (171 lb 9.6 oz). Body surface area is 1.83 meters squared.  No Pain (0) Comment: Data Unavailable   Patient's last menstrual period was 10/17/2021.  Allergies reviewed: Yes  Medications reviewed: Yes    Medications: Medication refills not needed today.  Pharmacy name entered into [facility]: [id number] - [place], [place] - [address] AT [place] [address]      Renetta Calzada CMA              "

## 2022-06-01 NOTE — PROGRESS NOTES
[facility]    Hematology/Oncology Established Patient Follow-up Note    Treatment Summary:      Today's Date: 22    Reason for Follow-up: Thrombocytopenia affecting pregnancy  Referring Provider: Natalie Soliz MD      HISTORY OF PRESENT ILLNESS: Allison Henao is a  42 year old female who is referred for anemia and thrombocytopenia in pregnancy. She is currently 32 weeks gestation. Past medical history is significant for ovarian cyst, prediabetes, HLD. She is Malay-speaking and history is obtained via virtual .     She is originally from [facility] and emigrated to the  in 2007. She has delivered her two children in [facility] without CBC abnormality, both alive and well. Both were delivered vaginally without excessive bleed. [place] has had one miscarriage in  at 6 weeks gestation. She had vaginal bleeding for 10 days.     Since , WBC has ranged 5.9-8.4, hemoglobin has decreased from 12.8 to 10.7 (MCV 80s), platelets have ranged 109-178. On 22, ferritin 10, TIBC 512, iron sat 13%. HIV negative. Hep B surface antigen negative. She takes iron and prenatal vitamin.     No epistaxis, gingival bleed, petechiae, hematuria/hematochezia/melena, or vaginal bleed. LMP was 2021. Planned for vaginal delivery 22.     She denies malar rash. She has intermittent arthralgias, but in the shoulders. 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        INTERIM HISTORY:  No epistaxis, gingival bleed, vaginal bleed, hematuria, hematochezia/melena. Dark stools with iron supplementation. Taking B12 daily at night.    Venofer , , . On , WBC 6.8, Hb 11.4, PLT 139K.      She is planned for induction on 22 due to thrombocytopenia and anemia.    She has heartburn. She will wake at 3-6 AM with dizziness. No nausea/vomiting.       REVIEW OF SYSTEMS:   A 14 point ROS was reviewed with pertinent positives and negatives in the HPI.        HOME MEDICATIONS:  Current Outpatient Medications   Medication Sig Dispense Refill     cyanocobalamin (VITAMIN B-12) 1000 MCG tablet Take 1 tablet (1,000 mcg) by mouth daily 30 tablet 3     ferrous sulfate 140 (45 Fe) MG TBCR CR tablet Take 140 mg by mouth daily       Prenatal Vit-Fe Fumarate-FA (PRENATAL VITAMIN) 27-0.8 MG TABS Take 1 tablet by mouth daily 90 tablet 3         ALLERGIES:  No Known Allergies      PAST MEDICAL HISTORY:  Past Medical History:   Diagnosis Date     Ovarian cyst          PAST SURGICAL HISTORY:  Past Surgical History:   Procedure Laterality Date     ovarian cystectomy  09/21/2018         SOCIAL HISTORY:  Social History     Socioeconomic History     Marital status:      Spouse name: Not on file     Number of children: Not on file     Years of education: Not on file     Highest education level: Not on file   Occupational History     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, [employer]   Tobacco Use     Smoking status: Never Smoker     Smokeless tobacco: Never Used   Vaping Use     Vaping Use: Never used   Substance and Sexual Activity     Alcohol use: Not Currently     Drug use: Never     Sexual activity: Yes     Partners: Male   Other Topics Concern     Not on file   Social History Narrative     Not on file     Social Determinants of Health     Financial Resource Strain: Not on file   Food Insecurity: Not on file   Transportation Needs: Not on file   Physical Activity: Not on file   Stress: Not on file   Social Connections: Not on file   Intimate Partner Violence: Not At Risk     Fear of Current or Ex-Partner: No     Emotionally Abused: No     Physically Abused: No     Sexually Abused: No   Housing Stability: Not on file         FAMILY HISTORY:  Family History   Problem Relation Age of Onset     Thyroid Disease Mother      Pacemaker Mother      Pacemaker Father          PHYSICAL EXAM:  Vital signs:  /61   Pulse 72   Temp 98.3  F (36.8  C) (Tympanic)   Resp 16   Wt 77.8 kg (171 lb  9.6 oz)   LMP 10/17/2021   SpO2 98%   BMI 32.69 kg/m     GENERAL/CONSTITUTIONAL: No acute distress.  EYES: Pupils are equal, round, and react to light and accommodation. Extraocular movements intact.  No scleral icterus.  ENT/MOUTH: Neck supple. Oropharynx clear, no mucositis.  LYMPH: No anterior cervical, posterior cervical, supraclavicular, axillary or inguinal adenopathy.   RESPIRATORY: Clear to auscultation bilaterally. No crackles or wheezing.   CARDIOVASCULAR: Regular rate and rhythm without murmurs, gallops, or rubs.  GASTROINTESTINAL: No hepatosplenomegaly, masses, or tenderness. The patient has normal bowel sounds. No guarding.  No distention. Gravid abdomen.  MUSCULOSKELETAL: Warm and well-perfused, no cyanosis, clubbing, or edema.  NEUROLOGIC: Cranial nerves II-XII are intact. Alert, oriented, answers questions appropriately.  INTEGUMENTARY: No rashes or jaundice.  GAIT: Steady, does not use assistive device      LABS:  CBC RESULTS:   Recent Labs   Lab Test 22  1438   WBC 6.8   RBC 3.87   HGB 11.4*   HCT 35.3   MCV 91   MCH 29.5   MCHC 32.3   RDW 13.2   *       Recent Labs   Lab Test 22  1438 22  1352    136   POTASSIUM 3.5 3.3*   CHLORIDE 108 106   CO2 22 23   ANIONGAP 8 7   * 85   BUN 10 9   CR 0.33* 0.35*   DENITA 8.6 8.8         PATHOLOGY:  Final Diagnosis 3/28/22:   A.  Peripheral blood smear for morphology:  - Isolated, borderline thrombocytopenia.            IMAGING:  N/A     ASSESSMENT/PLAN:  Allison Henao is a  42 year old female who is referred for anemia and thrombocytopenia in pregnancy. She is currently 32 weeks gestation. Past medical history is significant for ovarian cyst, prediabetes, HLD. She is Slovak-speaking and history is obtained via virtual . Induction is planned for 22. Her actual due date is 22.     Discussed with patient her past medical history, previous pregnancies, and current laboratory findings. She has  evidence of iron deficiency anemia due to pregnancy and will supplement with venofer 300 mg weekly x3 weeks. She should continue daily iron, VitC, and prenatal vitamin.      For thrombocytopenia, differential includes gestational thrombocytopenia as platelets continue to be >100K. However, we discussed the possibility of ITP and need to monitor for HELLP/DIC. We discussed the possibility of steroid pulse or IVIG in the future.      Repeat CBC returned with PLT of 147K. WBC and Hb WNL. Renal function and LFTs WNL. LDH and haptoglobin WNL. Folate 36.1. B12 borderline at 227 and started on supplementation. Coag studies WNL. Peripheral smear with isolated, borderline thrombocytopenia. Otherwise, no other abnormalities.    She is seeing OB/GYN weekly. She will see Dr. Soliz at the end of the month.      -Patient's thrombocytopenia is most likely due to gestational thrombocytopenia (physiologic).   -Repeat labs every 2 weeks.  -Continue once daily ferrous sulfate, VitC, B12 supplementation.  -s/p venofer x3 5/9-5/23/22. Hemoglobin 10-11.  -Once weekly labs on Mondays. Follow up with Dr. Roque in clinic weekly on Wednesdays.     Discussed with MFM/OB teams.      Thank you referring Mrs. Henao to clinic. Adequate time was dedicated to patient questions and answered to the expressed satisfaction of the patient.      Complexity: HIGH.        Zari Roque DO  Hematology/Oncology  [facility]

## 2022-06-01 NOTE — LETTER
2022         RE: Allison Henao  [address] [facility] [address]        Dear Colleague,    Thank you for referring your patient, Allison Henao, to the [facility] [place]. Please see a copy of my visit note below.    [facility]    Hematology/Oncology Established Patient Follow-up Note    Treatment Summary:      Today's Date: 22    Reason for Follow-up: Thrombocytopenia affecting pregnancy  Referring Provider: Natalie Soliz MD      HISTORY OF PRESENT ILLNESS: Allison Henao is a  42 year old female who is referred for anemia and thrombocytopenia in pregnancy. She is currently 32 weeks gestation. Past medical history is significant for ovarian cyst, prediabetes, HLD. She is Yoruba-speaking and history is obtained via virtual .     She is originally from [facility] and [place] the [place]. She has delivered her two children in [facility] without CBC abnormality, both alive and well. Both were delivered vaginally without excessive bleed. [place] has had one miscarriage in  at 6 weeks gestation. She had vaginal bleeding for 10 days.     Since , WBC has ranged 5.9-8.4, hemoglobin has decreased from 12.8 to 10.7 (MCV 80s), platelets have ranged 109-178. On 22, ferritin 10, TIBC 512, iron sat 13%. HIV negative. Hep B surface antigen negative. She takes iron and prenatal vitamin.     No epistaxis, gingival bleed, petechiae, hematuria/hematochezia/melena, or vaginal bleed. LMP was 2021. Planned for vaginal delivery 22.     She denies malar rash. She has intermittent arthralgias, but in the shoulders. 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        INTERIM HISTORY:  No epistaxis, gingival bleed, vaginal bleed, hematuria, hematochezia/melena. Dark stools with iron supplementation. Taking B12 daily at night.    Venofer , , . On , WBC 6.8, Hb 11.4, PLT 139K.      She is  planned for induction on 7/18/22 due to thrombocytopenia and anemia.    She has heartburn. She will wake at 3-6 AM with dizziness. No nausea/vomiting.       REVIEW OF SYSTEMS:   A 14 point ROS was reviewed with pertinent positives and negatives in the HPI.       HOME MEDICATIONS:  Current Outpatient Medications   Medication Sig Dispense Refill     cyanocobalamin (VITAMIN B-12) 1000 MCG tablet Take 1 tablet (1,000 mcg) by mouth daily 30 tablet 3     ferrous sulfate 140 (45 Fe) MG TBCR CR tablet Take 140 mg by mouth daily       Prenatal Vit-Fe Fumarate-FA (PRENATAL VITAMIN) 27-0.8 MG TABS Take 1 tablet by mouth daily 90 tablet 3         ALLERGIES:  No Known Allergies      PAST MEDICAL HISTORY:  Past Medical History:   Diagnosis Date     Ovarian cyst          PAST SURGICAL HISTORY:  Past Surgical History:   Procedure Laterality Date     ovarian cystectomy  09/21/2018         SOCIAL HISTORY:  Social History     Socioeconomic History     Marital status:      Spouse name: Not on file     Number of children: Not on file     Years of education: Not on file     Highest education level: Not on file   Occupational History     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, [employer]   Tobacco Use     Smoking status: Never Smoker     Smokeless tobacco: Never Used   Vaping Use     Vaping Use: Never used   Substance and Sexual Activity     Alcohol use: Not Currently     Drug use: Never     Sexual activity: Yes     Partners: Male   Other Topics Concern     Not on file   Social History Narrative     Not on file     Social Determinants of Health     Financial Resource Strain: Not on file   Food Insecurity: Not on file   Transportation Needs: Not on file   Physical Activity: Not on file   Stress: Not on file   Social Connections: Not on file   Intimate Partner Violence: Not At Risk     Fear of Current or Ex-Partner: No     Emotionally Abused: No     Physically Abused: No     Sexually Abused: No   Housing Stability: Not on file         FAMILY  HISTORY:  Family History   Problem Relation Age of Onset     Thyroid Disease Mother      Pacemaker Mother      Pacemaker Father          PHYSICAL EXAM:  Vital signs:  /61   Pulse 72   Temp 98.3  F (36.8  C) (Tympanic)   Resp 16   Wt 77.8 kg (171 lb 9.6 oz)   LMP 10/17/2021   SpO2 98%   BMI 32.69 kg/m     GENERAL/CONSTITUTIONAL: No acute distress.  EYES: Pupils are equal, round, and react to light and accommodation. Extraocular movements intact.  No scleral icterus.  ENT/MOUTH: Neck supple. Oropharynx clear, no mucositis.  LYMPH: No anterior cervical, posterior cervical, supraclavicular, axillary or inguinal adenopathy.   RESPIRATORY: Clear to auscultation bilaterally. No crackles or wheezing.   CARDIOVASCULAR: Regular rate and rhythm without murmurs, gallops, or rubs.  GASTROINTESTINAL: No hepatosplenomegaly, masses, or tenderness. The patient has normal bowel sounds. No guarding.  No distention. Gravid abdomen.  MUSCULOSKELETAL: Warm and well-perfused, no cyanosis, clubbing, or edema.  NEUROLOGIC: Cranial nerves II-XII are intact. Alert, oriented, answers questions appropriately.  INTEGUMENTARY: No rashes or jaundice.  GAIT: Steady, does not use assistive device      LABS:  CBC RESULTS:   Recent Labs   Lab Test 22  1438   WBC 6.8   RBC 3.87   HGB 11.4*   HCT 35.3   MCV 91   MCH 29.5   MCHC 32.3   RDW 13.2   *       Recent Labs   Lab Test 22  1438 22  1352    136   POTASSIUM 3.5 3.3*   CHLORIDE 108 106   CO2 22 23   ANIONGAP 8 7   * 85   BUN 10 9   CR 0.33* 0.35*   DENITA 8.6 8.8         PATHOLOGY:  Final Diagnosis 3/28/22:   A.  Peripheral blood smear for morphology:  - Isolated, borderline thrombocytopenia.            IMAGING:  N/A     ASSESSMENT/PLAN:  Allison Henao is a  42 year old female who is referred for anemia and thrombocytopenia in pregnancy. She is currently 32 weeks gestation. Past medical history is significant for ovarian cyst, prediabetes,  HLD. She is Slovenian-speaking and history is obtained via virtual . Induction is planned for 7/18/22. Her actual due date is 7/24/22.     Discussed with patient her past medical history, previous pregnancies, and current laboratory findings. She has evidence of iron deficiency anemia due to pregnancy and will supplement with venofer 300 mg weekly x3 weeks. She should continue daily iron, VitC, and prenatal vitamin.      For thrombocytopenia, differential includes gestational thrombocytopenia as platelets continue to be >100K. However, we discussed the possibility of ITP and need to monitor for HELLP/DIC. We discussed the possibility of steroid pulse or IVIG in the future.      Repeat CBC returned with PLT of 147K. WBC and Hb WNL. Renal function and LFTs WNL. LDH and haptoglobin WNL. Folate 36.1. B12 borderline at 227 and started on supplementation. Coag studies WNL. Peripheral smear with isolated, borderline thrombocytopenia. Otherwise, no other abnormalities.    She is seeing OB/GYN weekly. She will see Dr. Soliz at the end of the month.      -Patient's thrombocytopenia is most likely due to gestational thrombocytopenia (physiologic).   -Repeat labs every 2 weeks.  -Continue once daily ferrous sulfate, VitC, B12 supplementation.  -s/p venofer x3 5/9-5/23/22. Hemoglobin 10-11.  -Once weekly labs on Mondays. Follow up with Dr. Roque in clinic weekly on Wednesdays.     Discussed with MFM/OB teams.      Thank you referring Mrs. Henao to clinic. Adequate time was dedicated to patient questions and answered to the expressed satisfaction of the patient.      Complexity: HIGH.        Zari Roque,   Hematology/Oncology  [facility]          Oncology Rooming Note    June 1, 2022 12:50 PM   Allison Henao is a 42 year old female who presents for:    Chief Complaint   Patient presents with     Oncology Clinic Visit     Thrombocytopenia affecting pregnancy     Initial Vitals: /61    "Pulse 72   Temp 98.3  F (36.8  C) (Tympanic)   Resp 16   Wt 77.8 kg (171 lb 9.6 oz)   LMP 10/17/2021   SpO2 98%   BMI 32.69 kg/m   Estimated body mass index is 32.69 kg/m  as calculated from the following:    Height as of 5/4/22: 1.543 m (5' 0.75\").    Weight as of this encounter: 77.8 kg (171 lb 9.6 oz). Body surface area is 1.83 meters squared.  No Pain (0) Comment: Data Unavailable   Patient's last menstrual period was 10/17/2021.  Allergies reviewed: Yes  Medications reviewed: Yes    Medications: Medication refills not needed today.  Pharmacy [facility] entered into [employer]: [id number] [id number] AT [place] [address]      Renetta Calzada CMA                  Again, thank you for allowing me to participate in the care of your patient.        Sincerely,        Zari Roque, DO    "

## 2022-06-02 ENCOUNTER — PRENATAL OFFICE VISIT (OUTPATIENT)
Dept: OBGYN | Facility: CLINIC | Age: 42
End: 2022-06-02
Payer: COMMERCIAL

## 2022-06-02 VITALS — BODY MASS INDEX: 32.77 KG/M2 | DIASTOLIC BLOOD PRESSURE: 60 MMHG | WEIGHT: 172 LBS | SYSTOLIC BLOOD PRESSURE: 108 MMHG

## 2022-06-02 DIAGNOSIS — D69.6 THROMBOCYTOPENIA AFFECTING PREGNANCY (H): ICD-10-CM

## 2022-06-02 DIAGNOSIS — O09.529 ANTEPARTUM MULTIGRAVIDA OF ADVANCED MATERNAL AGE: Primary | ICD-10-CM

## 2022-06-02 DIAGNOSIS — O99.119 THROMBOCYTOPENIA AFFECTING PREGNANCY (H): ICD-10-CM

## 2022-06-02 LAB
HAPTOGLOB SERPL-MCNC: 114 MG/DL (ref 32–197)
PATH REPORT.COMMENTS IMP SPEC: NORMAL
PATH REPORT.COMMENTS IMP SPEC: NORMAL
PATH REPORT.FINAL DX SPEC: NORMAL
PATH REPORT.MICROSCOPIC SPEC OTHER STN: NORMAL
PATH REPORT.MICROSCOPIC SPEC OTHER STN: NORMAL

## 2022-06-02 PROCEDURE — 99207 PR PRENATAL VISIT: CPT | Performed by: OBSTETRICS & GYNECOLOGY

## 2022-06-02 NOTE — PROGRESS NOTES
Doing well.   No complaints.   Denies VB, ctx, LOF. +FM  /60 (BP Location: Right arm, Patient Position: Chair, Cuff Size: Adult Large)   Wt 78 kg (172 lb)   LMP 10/17/2021   Breastfeeding No   BMI 32.77 kg/m    General Appearance: NAD  Abdomen: Gravid, NT  Refer to flow sheet above.   A/P: 42 year old  at 32w4d  -- thrombocytopenia: Plt level on  is 106K; s/p Hematology consultation (appreciate recommendations); has follow-up Plt testing next week and weekly lab appointments scheduled thereafter; also to follow-up with Hematology weekly   -- anemia: iron deficiency; s/p Venofir therapy; most recent Hgb 10.8 as of ; recommended to continue PO iron supplementation, VitC, B12 supplementation; Hematology following as well   -- PTL precautions reviewed  RTC in 1 week      Vanna Sosa MD  [facility]

## 2022-06-02 NOTE — LETTER
[facility] [place] [address]  [address]  [facility] [address]  Phone: [phone]  Fax: [phone]    06/02/22      To whom may concern:     Allison Henao is under my care for her pregnancy. For the duration of her pregnancy starting today, please allow patient to have a seated position, do to pregnancy related burden. If any questions, feel free to contact me.    Sincerely,      Vanna Sosa MD

## 2022-06-02 NOTE — NURSING NOTE
"Chief Complaint   Patient presents with     Prenatal Care     32 4/7 weeks       Initial /60 (BP Location: Right arm, Patient Position: Chair, Cuff Size: Adult Large)   Wt 78 kg (172 lb)   LMP 10/17/2021   Breastfeeding No   BMI 32.77 kg/m   Estimated body mass index is 32.77 kg/m  as calculated from the following:    Height as of 22: 1.543 m (5' 0.75\").    Weight as of this encounter: 78 kg (172 lb).  BP completed using cuff size: large    Questioned patient about current smoking habits.  Pt. has never smoked.          The following HM Due: NONE    +fetal movement  ++swelling   infeet      Malina Mccartney, CMA    "

## 2022-06-06 ENCOUNTER — LAB (OUTPATIENT)
Dept: LAB | Facility: CLINIC | Age: 42
End: 2022-06-06
Payer: COMMERCIAL

## 2022-06-06 DIAGNOSIS — D69.6 THROMBOCYTOPENIA AFFECTING PREGNANCY (H): ICD-10-CM

## 2022-06-06 DIAGNOSIS — O99.119 THROMBOCYTOPENIA AFFECTING PREGNANCY (H): ICD-10-CM

## 2022-06-06 LAB
BASOPHILS # BLD AUTO: 0 10E3/UL (ref 0–0.2)
BASOPHILS NFR BLD AUTO: 0 %
EOSINOPHIL # BLD AUTO: 0 10E3/UL (ref 0–0.7)
EOSINOPHIL NFR BLD AUTO: 0 %
ERYTHROCYTE [DISTWIDTH] IN BLOOD BY AUTOMATED COUNT: 13.8 % (ref 10–15)
HCT VFR BLD AUTO: 35.6 % (ref 35–47)
HGB BLD-MCNC: 11.6 G/DL (ref 11.7–15.7)
IMM GRANULOCYTES # BLD: 0 10E3/UL
IMM GRANULOCYTES NFR BLD: 0 %
LDH SERPL L TO P-CCNC: 174 U/L (ref 81–234)
LYMPHOCYTES # BLD AUTO: 1.2 10E3/UL (ref 0.8–5.3)
LYMPHOCYTES NFR BLD AUTO: 17 %
MCH RBC QN AUTO: 29.6 PG (ref 26.5–33)
MCHC RBC AUTO-ENTMCNC: 32.6 G/DL (ref 31.5–36.5)
MCV RBC AUTO: 91 FL (ref 78–100)
MONOCYTES # BLD AUTO: 0.4 10E3/UL (ref 0–1.3)
MONOCYTES NFR BLD AUTO: 6 %
NEUTROPHILS # BLD AUTO: 5.5 10E3/UL (ref 1.6–8.3)
NEUTROPHILS NFR BLD AUTO: 77 %
PLATELET # BLD AUTO: 105 10E3/UL (ref 150–450)
RBC # BLD AUTO: 3.92 10E6/UL (ref 3.8–5.2)
RETICS # AUTO: 0.11 10E6/UL (ref 0.03–0.1)
RETICS/RBC NFR AUTO: 2.9 % (ref 0.5–2)
WBC # BLD AUTO: 7.1 10E3/UL (ref 4–11)

## 2022-06-06 PROCEDURE — 85610 PROTHROMBIN TIME: CPT

## 2022-06-06 PROCEDURE — 83010 ASSAY OF HAPTOGLOBIN QUANT: CPT

## 2022-06-06 PROCEDURE — 85730 THROMBOPLASTIN TIME PARTIAL: CPT

## 2022-06-06 PROCEDURE — 80053 COMPREHEN METABOLIC PANEL: CPT

## 2022-06-06 PROCEDURE — 85384 FIBRINOGEN ACTIVITY: CPT

## 2022-06-06 PROCEDURE — 85045 AUTOMATED RETICULOCYTE COUNT: CPT

## 2022-06-06 PROCEDURE — 83615 LACTATE (LD) (LDH) ENZYME: CPT

## 2022-06-06 PROCEDURE — 85060 BLOOD SMEAR INTERPRETATION: CPT | Performed by: PATHOLOGY

## 2022-06-06 PROCEDURE — 36415 COLL VENOUS BLD VENIPUNCTURE: CPT

## 2022-06-06 PROCEDURE — 85025 COMPLETE CBC W/AUTO DIFF WBC: CPT

## 2022-06-07 LAB
ALBUMIN SERPL-MCNC: 2.6 G/DL (ref 3.4–5)
ALP SERPL-CCNC: 148 U/L (ref 40–150)
ALT SERPL W P-5'-P-CCNC: 22 U/L (ref 0–50)
ANION GAP SERPL CALCULATED.3IONS-SCNC: 9 MMOL/L (ref 3–14)
APTT PPP: 36 SECONDS (ref 22–38)
AST SERPL W P-5'-P-CCNC: 18 U/L (ref 0–45)
BILIRUB SERPL-MCNC: 0.3 MG/DL (ref 0.2–1.3)
BUN SERPL-MCNC: 13 MG/DL (ref 7–30)
CALCIUM SERPL-MCNC: 8.5 MG/DL (ref 8.5–10.1)
CHLORIDE BLD-SCNC: 110 MMOL/L (ref 94–109)
CO2 SERPL-SCNC: 19 MMOL/L (ref 20–32)
CREAT SERPL-MCNC: 0.59 MG/DL (ref 0.52–1.04)
FIBRINOGEN PPP-MCNC: 403 MG/DL (ref 170–490)
GFR SERPL CREATININE-BSD FRML MDRD: >90 ML/MIN/1.73M2
GLUCOSE BLD-MCNC: 123 MG/DL (ref 70–99)
INR PPP: 1.03 (ref 0.85–1.15)
PATH REPORT.COMMENTS IMP SPEC: NORMAL
PATH REPORT.FINAL DX SPEC: NORMAL
PATH REPORT.MICROSCOPIC SPEC OTHER STN: NORMAL
PATH REPORT.MICROSCOPIC SPEC OTHER STN: NORMAL
PATH REPORT.RELEVANT HX SPEC: NORMAL
POTASSIUM BLD-SCNC: 3.8 MMOL/L (ref 3.4–5.3)
PROT SERPL-MCNC: 6.5 G/DL (ref 6.8–8.8)
SODIUM SERPL-SCNC: 138 MMOL/L (ref 133–144)

## 2022-06-07 NOTE — PROGRESS NOTES
[facility]    Hematology/Oncology Established Patient Follow-up Note    Treatment Summary:      Today's Date: 22    Reason for Follow-up: Thrombocytopenia affecting pregnancy  Referring Provider: Natalie Soliz MD      HISTORY OF PRESENT ILLNESS: Allison Henao is a  42 year old female who is referred for anemia and thrombocytopenia in pregnancy. She is currently 32 weeks gestation. Past medical history is significant for ovarian cyst, prediabetes, HLD. She is Serbian-speaking and history is obtained via virtual .     [place] to the  in [place]. [place] has [place] [facility] [place] abnormality, both alive and well. Both were delivered vaginally without excessive bleed. [place] has had one miscarriage in  at 6 weeks gestation. She had vaginal bleeding for 10 days.     Since , WBC has ranged 5.9-8.4, hemoglobin has decreased from 12.8 to 10.7 (MCV 80s), platelets have ranged 109-178. On 22, ferritin 10, TIBC 512, iron sat 13%. HIV negative. Hep B surface antigen negative. She takes iron and prenatal vitamin.     No epistaxis, gingival bleed, petechiae, hematuria/hematochezia/melena, or vaginal bleed. LMP was 2021. Planned for vaginal delivery 22.     She denies malar rash. She has intermittent arthralgias, but in the shoulders. 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     INTERIM HISTORY:  No epistaxis, gingival bleed, vaginal bleed, hematuria, hematochezia/melena. Dark stools with iron supplementation. Taking B12 daily at night.     Venofer , , . Platelets stable at 105K 22.     She is planned for induction on 22 due to thrombocytopenia and anemia.      REVIEW OF SYSTEMS:   A 14 point ROS was reviewed with pertinent positives and negatives in the HPI.       HOME MEDICATIONS:  Current Outpatient Medications   Medication Sig Dispense Refill      cyanocobalamin (VITAMIN B-12) 1000 MCG tablet Take 1 tablet (1,000 mcg) by mouth daily 30 tablet 3     ferrous sulfate 140 (45 Fe) MG TBCR CR tablet Take 140 mg by mouth daily       Prenatal Vit-Fe Fumarate-FA (PRENATAL VITAMIN) 27-0.8 MG TABS Take 1 tablet by mouth daily 90 tablet 3         ALLERGIES:  No Known Allergies      PAST MEDICAL HISTORY:  Past Medical History:   Diagnosis Date     Ovarian cyst          PAST SURGICAL HISTORY:  Past Surgical History:   Procedure Laterality Date     ovarian cystectomy  09/21/2018         SOCIAL HISTORY:  Social History     Socioeconomic History     Marital status:      Spouse name: Not on file     Number of children: Not on file     Years of education: Not on file     Highest education level: Not on file   Occupational History     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, [employer]   Tobacco Use     Smoking status: Never Smoker     Smokeless tobacco: Never Used   Vaping Use     Vaping Use: Never used   Substance and Sexual Activity     Alcohol use: Not Currently     Drug use: Never     Sexual activity: Yes     Partners: Male   Other Topics Concern     Not on file   Social History Narrative     Not on file     Social Determinants of Health     Financial Resource Strain: Not on file   Food Insecurity: Not on file   Transportation Needs: Not on file   Physical Activity: Not on file   Stress: Not on file   Social Connections: Not on file   Intimate Partner Violence: Not At Risk     Fear of Current or Ex-Partner: No     Emotionally Abused: No     Physically Abused: No     Sexually Abused: No   Housing Stability: Not on file         FAMILY HISTORY:  Family History   Problem Relation Age of Onset     Thyroid Disease Mother      Pacemaker Mother      Pacemaker Father          PHYSICAL EXAM:  Vital signs:  /64 (Cuff Size: Adult Regular)   Pulse 72   Temp 98.4  F (36.9  C) (Tympanic)   Resp 16   Wt 77.6 kg (171 lb)   LMP 10/17/2021   SpO2 98%   BMI 32.58 kg/m      GENERAL/CONSTITUTIONAL: No acute distress.  EYES: Pupils are equal, round, and react to light and accommodation. Extraocular movements intact.  No scleral icterus.  ENT/MOUTH: Neck supple. Oropharynx clear, no mucositis.  LYMPH: No anterior cervical, posterior cervical, supraclavicular, axillary or inguinal adenopathy.   RESPIRATORY: Clear to auscultation bilaterally. No crackles or wheezing.   CARDIOVASCULAR: Regular rate and rhythm without murmurs, gallops, or rubs.  GASTROINTESTINAL: No hepatosplenomegaly, masses, or tenderness. The patient has normal bowel sounds. No guarding.  No distention. Gravid abdomen.  MUSCULOSKELETAL: Warm and well-perfused, no cyanosis, clubbing, or edema.  NEUROLOGIC: Cranial nerves II-XII are intact. Alert, oriented, answers questions appropriately.  INTEGUMENTARY: No rashes or jaundice.  GAIT: Steady, does not use assistive device      LABS:  CBC RESULTS:   Recent Labs   Lab Test 22  1524   WBC 7.1   RBC 3.92   HGB 11.6*   HCT 35.6   MCV 91   MCH 29.6   MCHC 32.6   RDW 13.8   *       Recent Labs   Lab Test 22  1524 22  1245    140   POTASSIUM 3.8 3.2*   CHLORIDE 110* 108   CO2 19* 23   ANIONGAP 9 9   * 113*   BUN 13 6*   CR 0.59 0.32*   DENITA 8.5 8.4*     PATHOLOGY:  Final Diagnosis 3/28/22:   A.  Peripheral blood smear for morphology:  - Isolated, borderline thrombocytopenia.            IMAGING:  N/A     ASSESSMENT/PLAN:  Allison Henao is a  42 year old female who is referred for anemia and thrombocytopenia in pregnancy. She is currently 32 weeks gestation. Past medical history is significant for ovarian cyst, prediabetes, HLD. She is Icelandic-speaking and history is obtained via [employer] . Induction is planned for 22. Her actual due date is 22.     Discussed with patient her past medical history, previous pregnancies, and current laboratory findings. She has evidence of iron deficiency anemia due to pregnancy and will  supplement with venofer 300 mg weekly x3 weeks. She should continue daily iron, VitC, and prenatal vitamin.      For thrombocytopenia, differential includes gestational thrombocytopenia as platelets continue to be >100K. However, we discussed the possibility of ITP and need to monitor for HELLP/DIC. We discussed the possibility of steroid pulse or IVIG in the future.      Repeat CBC returned with PLT of 147K. WBC and Hb WNL. Renal function and LFTs WNL. LDH and haptoglobin WNL. Folate 36.1. B12 borderline at 227 and started on supplementation. Coag studies WNL. Peripheral smear with isolated, borderline thrombocytopenia. Otherwise, no other abnormalities.     She is seeing OB/GYN weekly. She will see Dr. Soliz at the end of the month.      -Patient's thrombocytopenia is most likely due to gestational thrombocytopenia (physiologic).   -Continue once daily ferrous sulfate, VitC, B12 supplementation.  -s/p venofer x3 5/9-5/23/22. Hemoglobin 11.8.  -Once weekly labs on Mondays. Follow up with Dr. Roque virtually weekly on Wednesdays.      Discussed with MFM/OB teams.      Thank you referring Mrs. Henao to clinic. Adequate time was dedicated to patient questions and answered to the expressed satisfaction of the patient.          Zari Roque, DO  Hematology/Oncology  [facility]

## 2022-06-08 ENCOUNTER — ONCOLOGY VISIT (OUTPATIENT)
Dept: ONCOLOGY | Facility: CLINIC | Age: 42
End: 2022-06-08
Attending: INTERNAL MEDICINE
Payer: COMMERCIAL

## 2022-06-08 VITALS
DIASTOLIC BLOOD PRESSURE: 64 MMHG | TEMPERATURE: 98.4 F | BODY MASS INDEX: 32.58 KG/M2 | SYSTOLIC BLOOD PRESSURE: 102 MMHG | WEIGHT: 171 LBS | HEART RATE: 72 BPM | RESPIRATION RATE: 16 BRPM | OXYGEN SATURATION: 98 %

## 2022-06-08 DIAGNOSIS — D69.6 THROMBOCYTOPENIA AFFECTING PREGNANCY (H): Primary | ICD-10-CM

## 2022-06-08 DIAGNOSIS — O99.119 THROMBOCYTOPENIA AFFECTING PREGNANCY (H): Primary | ICD-10-CM

## 2022-06-08 LAB — HAPTOGLOB SERPL-MCNC: 124 MG/DL (ref 32–197)

## 2022-06-08 PROCEDURE — 99214 OFFICE O/P EST MOD 30 MIN: CPT | Performed by: INTERNAL MEDICINE

## 2022-06-08 PROCEDURE — G0463 HOSPITAL OUTPT CLINIC VISIT: HCPCS

## 2022-06-08 ASSESSMENT — PAIN SCALES - GENERAL: PAINLEVEL: NO PAIN (0)

## 2022-06-08 NOTE — LETTER
2022         RE: Allison Henao  [id number] [facility] [address]        Dear Colleague,    Thank you for referring your patient, Allison Henao, to the [facility] [place]. Please see a copy of my visit note below.    [facility]    Hematology/Oncology Established Patient Follow-up Note    Treatment Summary:      Today's Date: 22    Reason for Follow-up: Thrombocytopenia affecting pregnancy  Referring Provider: Natalie Soliz MD      HISTORY OF PRESENT ILLNESS: Allison Henao is a  42 year old female who is referred for anemia and thrombocytopenia in pregnancy. She is currently 32 weeks gestation. Past medical history is significant for ovarian cyst, prediabetes, HLD. She is Icelandic-speaking and history is obtained via virtual .     She is originally from [facility] and [place] the [place]. She has delivered her two children in [facility] without CBC abnormality, both alive and well. Both were delivered vaginally without excessive bleed. [place] had one miscarriage in  at 6 weeks gestation. She had vaginal bleeding for 10 days.     Since , WBC has ranged 5.9-8.4, hemoglobin has decreased from 12.8 to 10.7 (MCV 80s), platelets have ranged 109-178. On 22, ferritin 10, TIBC 512, iron sat 13%. HIV negative. Hep B surface antigen negative. She takes iron and prenatal vitamin.     No epistaxis, gingival bleed, petechiae, hematuria/hematochezia/melena, or vaginal bleed. LMP was 2021. Planned for vaginal delivery 22.     She denies malar rash. She has intermittent arthralgias, but in the shoulders. 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     INTERIM HISTORY:  No epistaxis, gingival bleed, vaginal bleed, hematuria, hematochezia/melena. Dark stools with iron supplementation. Taking B12 daily at night.     Venofer , , . Platelets stable at 105K 22.     She is  planned for induction on 7/18/22 due to thrombocytopenia and anemia.      REVIEW OF SYSTEMS:   A 14 point ROS was reviewed with pertinent positives and negatives in the HPI.       HOME MEDICATIONS:  Current Outpatient Medications   Medication Sig Dispense Refill     cyanocobalamin (VITAMIN B-12) 1000 MCG tablet Take 1 tablet (1,000 mcg) by mouth daily 30 tablet 3     ferrous sulfate 140 (45 Fe) MG TBCR CR tablet Take 140 mg by mouth daily       Prenatal Vit-Fe Fumarate-FA (PRENATAL VITAMIN) 27-0.8 MG TABS Take 1 tablet by mouth daily 90 tablet 3         ALLERGIES:  No Known Allergies      PAST MEDICAL HISTORY:  Past Medical History:   Diagnosis Date     Ovarian cyst          PAST SURGICAL HISTORY:  Past Surgical History:   Procedure Laterality Date     ovarian cystectomy  09/21/2018         SOCIAL HISTORY:  Social History     Socioeconomic History     Marital status:      Spouse name: Not on file     Number of children: Not on file     Years of education: Not on file     Highest education level: Not on file   Occupational History     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, factory   Tobacco Use     Smoking status: Never Smoker     Smokeless tobacco: Never Used   Vaping Use     Vaping Use: Never used   Substance and Sexual Activity     Alcohol use: Not Currently     Drug use: Never     Sexual activity: Yes     Partners: Male   Other Topics Concern     Not on file   Social History Narrative     Not on file     Social Determinants of Health     Financial Resource Strain: Not on file   Food Insecurity: Not on file   Transportation Needs: Not on file   Physical Activity: Not on file   Stress: Not on file   Social Connections: Not on file   Intimate Partner Violence: Not At Risk     Fear of Current or Ex-Partner: No     Emotionally Abused: No     Physically Abused: No     Sexually Abused: No   Housing Stability: Not on file         FAMILY HISTORY:  Family History   Problem Relation Age of Onset     Thyroid Disease Mother       Pacemaker Mother      Pacemaker Father          PHYSICAL EXAM:  Vital signs:  /64 (Cuff Size: Adult Regular)   Pulse 72   Temp 98.4  F (36.9  C) (Tympanic)   Resp 16   Wt 77.6 kg (171 lb)   LMP 10/17/2021   SpO2 98%   BMI 32.58 kg/m     GENERAL/CONSTITUTIONAL: No acute distress.  EYES: Pupils are equal, round, and react to light and accommodation. Extraocular movements intact.  No scleral icterus.  ENT/MOUTH: Neck supple. Oropharynx clear, no mucositis.  LYMPH: No anterior cervical, posterior cervical, supraclavicular, axillary or inguinal adenopathy.   RESPIRATORY: Clear to auscultation bilaterally. No crackles or wheezing.   CARDIOVASCULAR: Regular rate and rhythm without murmurs, gallops, or rubs.  GASTROINTESTINAL: No hepatosplenomegaly, masses, or tenderness. The patient has normal bowel sounds. No guarding.  No distention. Gravid abdomen.  MUSCULOSKELETAL: Warm and well-perfused, no cyanosis, clubbing, or edema.  NEUROLOGIC: Cranial nerves II-XII are intact. Alert, oriented, answers questions appropriately.  INTEGUMENTARY: No rashes or jaundice.  GAIT: Steady, does not use assistive device      LABS:  CBC RESULTS:   Recent Labs   Lab Test 22  1524   WBC 7.1   RBC 3.92   HGB 11.6*   HCT 35.6   MCV 91   MCH 29.6   MCHC 32.6   RDW 13.8   *       Recent Labs   Lab Test 22  1524 22  1245    140   POTASSIUM 3.8 3.2*   CHLORIDE 110* 108   CO2 19* 23   ANIONGAP 9 9   * 113*   BUN 13 6*   CR 0.59 0.32*   DENITA 8.5 8.4*     PATHOLOGY:  Final Diagnosis 3/28/22:   A.  Peripheral blood smear for morphology:  - Isolated, borderline thrombocytopenia.            IMAGING:  N/A     ASSESSMENT/PLAN:  Allison Henao is a  42 year old female who is referred for anemia and thrombocytopenia in pregnancy. She is currently 32 weeks gestation. Past medical history is significant for ovarian cyst, prediabetes, HLD. She is Croatian-speaking and history is obtained via virtual  . Induction is planned for 7/18/22. Her actual due date is 7/24/22.     Discussed with patient her past medical history, previous pregnancies, and current laboratory findings. She has evidence of iron deficiency anemia due to pregnancy and will supplement with venofer 300 mg weekly x3 weeks. She should continue daily iron, VitC, and prenatal vitamin.      For thrombocytopenia, differential includes gestational thrombocytopenia as platelets continue to be >100K. However, we discussed the possibility of ITP and need to monitor for HELLP/DIC. We discussed the possibility of steroid pulse or IVIG in the future.      Repeat CBC returned with PLT of 147K. WBC and Hb WNL. Renal function and LFTs WNL. LDH and haptoglobin WNL. Folate 36.1. B12 borderline at 227 and started on supplementation. Coag studies WNL. Peripheral smear with isolated, borderline thrombocytopenia. Otherwise, no other abnormalities.     She is seeing OB/GYN weekly. She will see Dr. Soliz at the end of the month.      -Patient's thrombocytopenia is most likely due to gestational thrombocytopenia (physiologic).   -Continue once daily ferrous sulfate, VitC, B12 supplementation.  -s/p venofer x3 5/9-5/23/22. Hemoglobin 11.8.  -Once weekly labs on Mondays. Follow up with Dr. Roque virtually weekly on Wednesdays.      Discussed with MFM/OB teams.      Thank you referring Mrs. Henao to clinic. Adequate time was dedicated to patient questions and answered to the expressed satisfaction of the patient.          Zari Roque DO  Hematology/Oncology  [facility]               Again, thank you for allowing me to participate in the care of your patient.        Sincerely,        Zari Roque DO

## 2022-06-08 NOTE — NURSING NOTE
"Oncology Rooming Note    June 8, 2022 1:29 PM   Allison Henao is a 42 year old female who presents for:    Chief Complaint   Patient presents with     Oncology Clinic Visit     Thrombocytopenia affecting pregnancy      Initial Vitals: /64 (Cuff Size: Adult Regular)   Pulse 72   Temp 98.4  F (36.9  C) (Tympanic)   Resp 16   Wt 77.6 kg (171 lb)   LMP 10/17/2021   SpO2 98%   BMI 32.58 kg/m   Estimated body mass index is 32.58 kg/m  as calculated from the following:    Height as of 5/4/22: 1.543 m (5' 0.75\").    Weight as of this encounter: 77.6 kg (171 lb). Body surface area is 1.82 meters squared.  No Pain (0) Comment: Data Unavailable   Patient's last menstrual period was 10/17/2021.  Allergies reviewed: Yes  Medications reviewed: Yes    Medications: Medication refills not needed today.  Pharmacy name entered into [employer]: [id number] - [facility] [id number] AT [place] [address]    Clinical concerns: f/u       Sana Busch, HEATHER              "

## 2022-06-08 NOTE — LETTER
June 8, 2022        To whom it may concern:    Please excuse Allison from work for the remainder of her pregnancy for weekly labs as well as follow up with Hematology.      Sincerely,      Zari Roque, DO

## 2022-06-10 ENCOUNTER — PRENATAL OFFICE VISIT (OUTPATIENT)
Dept: OBGYN | Facility: CLINIC | Age: 42
End: 2022-06-10
Payer: COMMERCIAL

## 2022-06-10 VITALS — BODY MASS INDEX: 32.77 KG/M2 | DIASTOLIC BLOOD PRESSURE: 72 MMHG | SYSTOLIC BLOOD PRESSURE: 120 MMHG | WEIGHT: 172 LBS

## 2022-06-10 DIAGNOSIS — O09.899 SUPERVISION OF OTHER HIGH RISK PREGNANCY, ANTEPARTUM: ICD-10-CM

## 2022-06-10 DIAGNOSIS — O43.119 ANTEPARTUM PLACENTA CIRCUMVALLATA: ICD-10-CM

## 2022-06-10 DIAGNOSIS — O09.529 ANTEPARTUM MULTIGRAVIDA OF ADVANCED MATERNAL AGE: Primary | ICD-10-CM

## 2022-06-10 PROCEDURE — 99207 PR PRENATAL VISIT: CPT | Performed by: OBSTETRICS & GYNECOLOGY

## 2022-06-10 NOTE — PATIENT INSTRUCTIONS
You can reach your [place] Care Team any time of the day by calling [phone]. This number will put you in touch with the 24 hour nurse line if the clinic is closed.    To contact your OB/GYN Station Coordinator/Surgery Scheduler please call [phone]. This is a direct number for your care team between 8 a.m. and 4 p.m. Monday through Friday.    [facility] is open for your convenience:  Monday through Friday 8 a.m. to 6 p.m.  Closed weekends and all major holidays.

## 2022-06-10 NOTE — NURSING NOTE
"Chief Complaint   Patient presents with     Prenatal Care       Initial /72   Wt 78 kg (172 lb)   LMP 10/17/2021   Breastfeeding No   BMI 32.77 kg/m   Estimated body mass index is 32.77 kg/m  as calculated from the following:    Height as of 22: 1.543 m (5' 0.75\").    Weight as of this encounter: 78 kg (172 lb).  BP completed using cuff size: regular    Questioned patient about current smoking habits.  Pt. has never smoked.          33w5d  + FM daily  + cramping   + heartburn  + headache  Ayla Diego LPN                "

## 2022-06-10 NOTE — PROGRESS NOTES
Today's visit was conducted with the aid of the .  Allison Henao is a 42 year old female, 33w5d, Estimated Date of Delivery: 2022 who presents for prenatal care.  Good fetal movement.  No signs or symptoms of concern.  I reviewed the results of the most recent [facility] ultrasound showing fetal growth restriction concerns have resolved.  She will have weekly biophysical profiles beginning at 36 weeks, on 2022 because of advanced maternal age.  The patient understands this and understands her appointment follow-up schedule    A/P: Uterine pregnancy 33-5/7 weeks gestation    Advanced maternal age plan delivery at 39 weeks  History of thrombocytopenia  Circumvallate placenta  Presently doing well.  Signs and symptoms of concern discussed the patient will call.   testing as outlined above

## 2022-06-13 ENCOUNTER — LAB (OUTPATIENT)
Dept: LAB | Facility: CLINIC | Age: 42
End: 2022-06-13
Payer: COMMERCIAL

## 2022-06-13 DIAGNOSIS — D69.6 THROMBOCYTOPENIA AFFECTING PREGNANCY (H): ICD-10-CM

## 2022-06-13 DIAGNOSIS — O99.119 THROMBOCYTOPENIA AFFECTING PREGNANCY (H): ICD-10-CM

## 2022-06-13 LAB
BASOPHILS # BLD AUTO: 0 10E3/UL (ref 0–0.2)
BASOPHILS NFR BLD AUTO: 0 %
EOSINOPHIL # BLD AUTO: 0 10E3/UL (ref 0–0.7)
EOSINOPHIL NFR BLD AUTO: 0 %
ERYTHROCYTE [DISTWIDTH] IN BLOOD BY AUTOMATED COUNT: 13.6 % (ref 10–15)
HCT VFR BLD AUTO: 35.1 % (ref 35–47)
HGB BLD-MCNC: 11.9 G/DL (ref 11.7–15.7)
IMM GRANULOCYTES # BLD: 0.1 10E3/UL
IMM GRANULOCYTES NFR BLD: 1 %
LDH SERPL L TO P-CCNC: 158 U/L (ref 81–234)
LYMPHOCYTES # BLD AUTO: 1.2 10E3/UL (ref 0.8–5.3)
LYMPHOCYTES NFR BLD AUTO: 17 %
MCH RBC QN AUTO: 30.2 PG (ref 26.5–33)
MCHC RBC AUTO-ENTMCNC: 33.9 G/DL (ref 31.5–36.5)
MCV RBC AUTO: 89 FL (ref 78–100)
MONOCYTES # BLD AUTO: 0.4 10E3/UL (ref 0–1.3)
MONOCYTES NFR BLD AUTO: 5 %
NEUTROPHILS # BLD AUTO: 5.4 10E3/UL (ref 1.6–8.3)
NEUTROPHILS NFR BLD AUTO: 77 %
PLATELET # BLD AUTO: 113 10E3/UL (ref 150–450)
RBC # BLD AUTO: 3.94 10E6/UL (ref 3.8–5.2)
RETICS # AUTO: 0.1 10E6/UL (ref 0.03–0.1)
RETICS/RBC NFR AUTO: 2.4 % (ref 0.5–2)
WBC # BLD AUTO: 7 10E3/UL (ref 4–11)

## 2022-06-13 PROCEDURE — 85060 BLOOD SMEAR INTERPRETATION: CPT | Performed by: PATHOLOGY

## 2022-06-13 PROCEDURE — 85025 COMPLETE CBC W/AUTO DIFF WBC: CPT

## 2022-06-13 PROCEDURE — 36415 COLL VENOUS BLD VENIPUNCTURE: CPT

## 2022-06-13 PROCEDURE — 85045 AUTOMATED RETICULOCYTE COUNT: CPT

## 2022-06-13 PROCEDURE — 83010 ASSAY OF HAPTOGLOBIN QUANT: CPT

## 2022-06-13 PROCEDURE — 80053 COMPREHEN METABOLIC PANEL: CPT

## 2022-06-13 PROCEDURE — 83615 LACTATE (LD) (LDH) ENZYME: CPT

## 2022-06-13 PROCEDURE — 85384 FIBRINOGEN ACTIVITY: CPT

## 2022-06-13 PROCEDURE — 85610 PROTHROMBIN TIME: CPT

## 2022-06-13 PROCEDURE — 85730 THROMBOPLASTIN TIME PARTIAL: CPT

## 2022-06-14 ENCOUNTER — APPOINTMENT (OUTPATIENT)
Dept: INTERPRETER SERVICES | Facility: CLINIC | Age: 42
End: 2022-06-14
Payer: COMMERCIAL

## 2022-06-14 ENCOUNTER — PATIENT OUTREACH (OUTPATIENT)
Dept: ONCOLOGY | Facility: CLINIC | Age: 42
End: 2022-06-14
Payer: COMMERCIAL

## 2022-06-14 LAB
ALBUMIN SERPL-MCNC: 2.6 G/DL (ref 3.4–5)
ALP SERPL-CCNC: 161 U/L (ref 40–150)
ALT SERPL W P-5'-P-CCNC: 22 U/L (ref 0–50)
ANION GAP SERPL CALCULATED.3IONS-SCNC: 8 MMOL/L (ref 3–14)
APTT PPP: 37 SECONDS (ref 22–38)
AST SERPL W P-5'-P-CCNC: 22 U/L (ref 0–45)
BILIRUB SERPL-MCNC: 0.3 MG/DL (ref 0.2–1.3)
BUN SERPL-MCNC: 9 MG/DL (ref 7–30)
CALCIUM SERPL-MCNC: 9.2 MG/DL (ref 8.5–10.1)
CHLORIDE BLD-SCNC: 110 MMOL/L (ref 94–109)
CO2 SERPL-SCNC: 22 MMOL/L (ref 20–32)
CREAT SERPL-MCNC: 0.42 MG/DL (ref 0.52–1.04)
FIBRINOGEN PPP-MCNC: 414 MG/DL (ref 170–490)
GFR SERPL CREATININE-BSD FRML MDRD: >90 ML/MIN/1.73M2
GLUCOSE BLD-MCNC: 92 MG/DL (ref 70–99)
HAPTOGLOB SERPL-MCNC: 121 MG/DL (ref 32–197)
INR PPP: 1.06 (ref 0.85–1.15)
PATH REPORT.COMMENTS IMP SPEC: NORMAL
PATH REPORT.FINAL DX SPEC: NORMAL
PATH REPORT.MICROSCOPIC SPEC OTHER STN: NORMAL
PATH REPORT.MICROSCOPIC SPEC OTHER STN: NORMAL
POTASSIUM BLD-SCNC: 3.6 MMOL/L (ref 3.4–5.3)
PROT SERPL-MCNC: 6.4 G/DL (ref 6.8–8.8)
SODIUM SERPL-SCNC: 140 MMOL/L (ref 133–144)

## 2022-06-14 NOTE — PROGRESS NOTES
Per Dr. Roque:      Allison's CBC is improved this week. Hemoglobin is at 11.9 and platelets are at 113K. If Allison prefers, we can cancel our virtual visit this Wednesday. She should do CBC on Monday and Wednesday follow up next week in clinic with AW if platelets decrease again. If she would like to keep her appointment this Wednesday, that is okay too.     Writer contacted Allison with the assistance for [employer] to discuss Dr. Roque's recommendations/options. Advised Allison to return call to writer.    Dot Villanueva RN on 6/14/2022 at 8:57 AM

## 2022-06-15 ENCOUNTER — APPOINTMENT (OUTPATIENT)
Dept: INTERPRETER SERVICES | Facility: CLINIC | Age: 42
End: 2022-06-15
Payer: COMMERCIAL

## 2022-06-15 NOTE — PROGRESS NOTES
Allison returned call to  staff and cancelled her appointment.    Dot Villanueva RN on 6/15/2022 at 10:08 AM

## 2022-06-16 ENCOUNTER — PRENATAL OFFICE VISIT (OUTPATIENT)
Dept: OBGYN | Facility: CLINIC | Age: 42
End: 2022-06-16
Payer: COMMERCIAL

## 2022-06-16 VITALS
WEIGHT: 172.2 LBS | BODY MASS INDEX: 32.81 KG/M2 | DIASTOLIC BLOOD PRESSURE: 74 MMHG | SYSTOLIC BLOOD PRESSURE: 122 MMHG | HEART RATE: 87 BPM

## 2022-06-16 DIAGNOSIS — D69.6 THROMBOCYTOPENIA AFFECTING PREGNANCY (H): ICD-10-CM

## 2022-06-16 DIAGNOSIS — O99.119 THROMBOCYTOPENIA AFFECTING PREGNANCY (H): ICD-10-CM

## 2022-06-16 DIAGNOSIS — O09.899 SUPERVISION OF OTHER HIGH RISK PREGNANCY, ANTEPARTUM: Primary | ICD-10-CM

## 2022-06-16 PROCEDURE — 99207 PR PRENATAL VISIT: CPT | Performed by: OBSTETRICS & GYNECOLOGY

## 2022-06-16 NOTE — NURSING NOTE
"Chief Complaint   Patient presents with     Prenatal Care     34 weeks 4 days   MFM Following BPP's scheduled        Initial /74 (BP Location: Left arm, Cuff Size: Adult Regular)   Pulse 87   Wt 78.1 kg (172 lb 3.2 oz)   LMP 10/17/2021   Breastfeeding No   BMI 32.81 kg/m   Estimated body mass index is 32.81 kg/m  as calculated from the following:    Height as of 22: 1.543 m (5' 0.75\").    Weight as of this encounter: 78.1 kg (172 lb 3.2 oz).  BP completed using cuff size: regular    Questioned patient about current smoking habits.  Pt. has never smoked.    34w4d      The following HM Due: NONE      +FM daily   +Back/ hip pain   +Contractions possibly   +Pelvic pressure         Roxana Piedra, CMA on 2022 at 2:45 PM               "

## 2022-06-17 NOTE — PROGRESS NOTES
Doing well.   Occasional complains of pelvic pressure especially when walking.   Denies VB, ctx, LOF. +FM  /74 (BP Location: Left arm, Cuff Size: Adult Regular)   Pulse 87   Wt 78.1 kg (172 lb 3.2 oz)   LMP 10/17/2021   Breastfeeding No   BMI 32.81 kg/m    General Appearance: NAD  Abdomen: Gravid, NT  Refer to flow sheet above.   A/P: 42 year old  at 34w4d  -- thrombocytopenia: still being followed by hematology; recent Platelet level has improved to 113K  -- PTL precautions reviewed  RTC in 1 week      Vanna Sosa MD  [facility]

## 2022-06-20 ENCOUNTER — LAB (OUTPATIENT)
Dept: LAB | Facility: CLINIC | Age: 42
End: 2022-06-20
Payer: COMMERCIAL

## 2022-06-20 DIAGNOSIS — D69.6 THROMBOCYTOPENIA AFFECTING PREGNANCY (H): ICD-10-CM

## 2022-06-20 DIAGNOSIS — O99.119 THROMBOCYTOPENIA AFFECTING PREGNANCY (H): ICD-10-CM

## 2022-06-20 LAB
BASOPHILS # BLD AUTO: 0 10E3/UL (ref 0–0.2)
BASOPHILS NFR BLD AUTO: 0 %
EOSINOPHIL # BLD AUTO: 0 10E3/UL (ref 0–0.7)
EOSINOPHIL NFR BLD AUTO: 1 %
ERYTHROCYTE [DISTWIDTH] IN BLOOD BY AUTOMATED COUNT: 13.4 % (ref 10–15)
HCT VFR BLD AUTO: 35.9 % (ref 35–47)
HGB BLD-MCNC: 12 G/DL (ref 11.7–15.7)
IMM GRANULOCYTES # BLD: 0 10E3/UL
IMM GRANULOCYTES NFR BLD: 0 %
LDH SERPL L TO P-CCNC: 165 U/L (ref 81–234)
LYMPHOCYTES # BLD AUTO: 1.1 10E3/UL (ref 0.8–5.3)
LYMPHOCYTES NFR BLD AUTO: 15 %
MCH RBC QN AUTO: 29.8 PG (ref 26.5–33)
MCHC RBC AUTO-ENTMCNC: 33.4 G/DL (ref 31.5–36.5)
MCV RBC AUTO: 89 FL (ref 78–100)
MONOCYTES # BLD AUTO: 0.4 10E3/UL (ref 0–1.3)
MONOCYTES NFR BLD AUTO: 5 %
NEUTROPHILS # BLD AUTO: 5.8 10E3/UL (ref 1.6–8.3)
NEUTROPHILS NFR BLD AUTO: 79 %
PLATELET # BLD AUTO: 113 10E3/UL (ref 150–450)
RBC # BLD AUTO: 4.03 10E6/UL (ref 3.8–5.2)
RETICS # AUTO: 0.1 10E6/UL (ref 0.03–0.1)
RETICS/RBC NFR AUTO: 2.5 % (ref 0.5–2)
WBC # BLD AUTO: 7.4 10E3/UL (ref 4–11)

## 2022-06-20 PROCEDURE — 85384 FIBRINOGEN ACTIVITY: CPT

## 2022-06-20 PROCEDURE — 36415 COLL VENOUS BLD VENIPUNCTURE: CPT

## 2022-06-20 PROCEDURE — 83010 ASSAY OF HAPTOGLOBIN QUANT: CPT

## 2022-06-20 PROCEDURE — 85730 THROMBOPLASTIN TIME PARTIAL: CPT

## 2022-06-20 PROCEDURE — 85045 AUTOMATED RETICULOCYTE COUNT: CPT

## 2022-06-20 PROCEDURE — 85060 BLOOD SMEAR INTERPRETATION: CPT | Performed by: PATHOLOGY

## 2022-06-20 PROCEDURE — 80053 COMPREHEN METABOLIC PANEL: CPT

## 2022-06-20 PROCEDURE — 83615 LACTATE (LD) (LDH) ENZYME: CPT

## 2022-06-20 PROCEDURE — 85025 COMPLETE CBC W/AUTO DIFF WBC: CPT

## 2022-06-20 PROCEDURE — 85610 PROTHROMBIN TIME: CPT

## 2022-06-21 LAB
APTT PPP: 35 SECONDS (ref 22–38)
FIBRINOGEN PPP-MCNC: 407 MG/DL (ref 170–490)
INR PPP: 0.98 (ref 0.85–1.15)
PATH REPORT.COMMENTS IMP SPEC: NORMAL
PATH REPORT.FINAL DX SPEC: NORMAL
PATH REPORT.MICROSCOPIC SPEC OTHER STN: NORMAL
PATH REPORT.MICROSCOPIC SPEC OTHER STN: NORMAL

## 2022-06-22 ENCOUNTER — VIRTUAL VISIT (OUTPATIENT)
Dept: ONCOLOGY | Facility: CLINIC | Age: 42
End: 2022-06-22
Attending: PHYSICIAN ASSISTANT
Payer: COMMERCIAL

## 2022-06-22 DIAGNOSIS — O99.119 THROMBOCYTOPENIA AFFECTING PREGNANCY (H): Primary | ICD-10-CM

## 2022-06-22 DIAGNOSIS — D50.0 IRON DEFICIENCY ANEMIA DUE TO CHRONIC BLOOD LOSS: ICD-10-CM

## 2022-06-22 DIAGNOSIS — D69.6 THROMBOCYTOPENIA AFFECTING PREGNANCY (H): Primary | ICD-10-CM

## 2022-06-22 LAB
ALBUMIN SERPL-MCNC: 2.7 G/DL (ref 3.4–5)
ALP SERPL-CCNC: 188 U/L (ref 40–150)
ALT SERPL W P-5'-P-CCNC: 26 U/L (ref 0–50)
ANION GAP SERPL CALCULATED.3IONS-SCNC: 10 MMOL/L (ref 3–14)
AST SERPL W P-5'-P-CCNC: 20 U/L (ref 0–45)
BILIRUB SERPL-MCNC: 0.3 MG/DL (ref 0.2–1.3)
BUN SERPL-MCNC: 13 MG/DL (ref 7–30)
CALCIUM SERPL-MCNC: 8.8 MG/DL (ref 8.5–10.1)
CHLORIDE BLD-SCNC: 107 MMOL/L (ref 94–109)
CO2 SERPL-SCNC: 21 MMOL/L (ref 20–32)
CREAT SERPL-MCNC: 0.47 MG/DL (ref 0.52–1.04)
GFR SERPL CREATININE-BSD FRML MDRD: >90 ML/MIN/1.73M2
GLUCOSE BLD-MCNC: 99 MG/DL (ref 70–99)
HAPTOGLOB SERPL-MCNC: 128 MG/DL (ref 32–197)
POTASSIUM BLD-SCNC: 3.9 MMOL/L (ref 3.4–5.3)
PROT SERPL-MCNC: 6.7 G/DL (ref 6.8–8.8)
SODIUM SERPL-SCNC: 138 MMOL/L (ref 133–144)

## 2022-06-22 PROCEDURE — 99213 OFFICE O/P EST LOW 20 MIN: CPT | Mod: TEL | Performed by: PHYSICIAN ASSISTANT

## 2022-06-22 NOTE — PROGRESS NOTES
Allison is a 42 year old who is being evaluated via a billable video visit.      How would you like to obtain your AVS? MyChart  If the video visit is dropped, the invitation should be resent by: Text to cell phone: [phone]  Will anyone else be joining your video visit? No     Sherley Haines VF        Could not get  on video visit. Converted to telephone visit: 9 mintues    Oncology/Hematology Visit Note  2022    Reason for Visit: Follow up of thrombocytopenia affecting pregnancy     History of Present Illness: Allison Henao is a  42 year old female who is referred for anemia and thrombocytopenia in pregnancy. Past medical history is significant for ovarian cyst, prediabetes, HLD. She is Greek-speaking and history is obtained via virtual .      She is originally from [facility] and emigrated to the  in 2007. She has delivered her two children in [facility] without CBC abnormality, both alive and well. Both were delivered vaginally without excessive bleed. [place] had one miscarriage in  at 6 weeks gestation. She had vaginal bleeding for 10 days.     Work-up consistent with iron deficiency anemia due to pregnancy (given Venofer 300mg weekly x 3), mild B12 deficiency on PO supplement. Normal renal function, LFTs, LDH, haptoglobin, folate, coags, peripheral smear. Overall felt thrombocytopenia is physiologic.      We have been doing weekly lab monitoring     Interval History:  Allison is doing well. She has some leg soreness and dizziness in the AM that improves throughout the day. She pricilla any bleeding or bruising. No fevers, abdominal pain, headaches.     Current Outpatient Medications   Medication Sig Dispense Refill     cyanocobalamin (VITAMIN B-12) 1000 MCG tablet Take 1 tablet (1,000 mcg) by mouth daily 30 tablet 3     ferrous sulfate 140 (45 Fe) MG TBCR CR tablet Take 140 mg by mouth daily       Prenatal Vit-Fe Fumarate-FA (PRENATAL VITAMIN)  27-0.8 MG TABS Take 1 tablet by mouth daily 90 tablet 3       Past Medical History  Past Medical History:   Diagnosis Date     Ovarian cyst      Past Surgical History:   Procedure Laterality Date     ovarian cystectomy  09/21/2018     No Known Allergies  Social History   Social History     Tobacco Use     Smoking status: Never Smoker     Smokeless tobacco: Never Used   Vaping Use     Vaping Use: Never used   Substance Use Topics     Alcohol use: Not Currently     Drug use: Never      Past medical history and social history were reviewed.    Physical Examination:  LMP 10/17/2021   Wt Readings from Last 10 Encounters:   06/16/22 78.1 kg (172 lb 3.2 oz)   06/10/22 78 kg (172 lb)   06/08/22 77.6 kg (171 lb)   06/02/22 78 kg (172 lb)   06/01/22 77.8 kg (171 lb 9.6 oz)   05/24/22 77.6 kg (171 lb)   05/10/22 76.8 kg (169 lb 4.8 oz)   05/04/22 75.9 kg (167 lb 6.4 oz)   04/26/22 75.3 kg (166 lb)   04/06/22 73.9 kg (162 lb 14.4 oz)     Unable to do physical exam 2/2 telephone visit. Well sounding in no distress. Normal speech and thought process. Good voice quality. No audible wheezing or cough.    Laboratory Data:   Latest Reference Range & Units 06/20/22 15:43   Sodium 133 - 144 mmol/L 138   Potassium 3.4 - 5.3 mmol/L 3.9   Chloride 94 - 109 mmol/L 107   Carbon Dioxide 20 - 32 mmol/L 21   Urea Nitrogen 7 - 30 mg/dL 13   Creatinine 0.52 - 1.04 mg/dL 0.47 (L)   GFR Estimate >60 mL/min/1.73m2 >90   Calcium 8.5 - 10.1 mg/dL 8.8   Anion Gap 3 - 14 mmol/L 10   Albumin 3.4 - 5.0 g/dL 2.7 (L)   Protein Total 6.8 - 8.8 g/dL 6.7 (L)   Bilirubin Total 0.2 - 1.3 mg/dL 0.3   Alkaline Phosphatase 40 - 150 U/L 188 (H)   ALT 0 - 50 U/L 26   AST 0 - 45 U/L 20   Lactate Dehydrogenase 81 - 234 U/L 165   Glucose 70 - 99 mg/dL 99   WBC 4.0 - 11.0 10e3/uL 7.4   Hemoglobin 11.7 - 15.7 g/dL 12.0   Hematocrit 35.0 - 47.0 % 35.9   Platelet Count 150 - 450 10e3/uL 113 (L)   RBC Count 3.80 - 5.20 10e6/uL 4.03   MCV 78 - 100 fL 89   MCH 26.5 - 33.0 pg  29.8   MCHC 31.5 - 36.5 g/dL 33.4   RDW 10.0 - 15.0 % 13.4   % Neutrophils % 79   % Lymphocytes % 15   % Monocytes % 5   % Eosinophils % 1   % Basophils % 0   Absolute Basophils 0.0 - 0.2 10e3/uL 0.0   Absolute Eosinophils 0.0 - 0.7 10e3/uL 0.0   Absolute Immature Granulocytes <=0.4 10e3/uL 0.0   Absolute Lymphocytes 0.8 - 5.3 10e3/uL 1.1   Absolute Monocytes 0.0 - 1.3 10e3/uL 0.4   % Immature Granulocytes % 0   Absolute Neutrophils 1.6 - 8.3 10e3/uL 5.8   % Retic 0.5 - 2.0 % 2.5 (H)   Absolute Retic 0.025 - 0.095 10e6/uL 0.097 (H)   INR 0.85 - 1.15  0.98   PTT 22 - 38 Seconds 35   Fibrinogen 170 - 490 mg/dL 407   Haptoglobin 32 - 197 mg/dL 128   (L): Data is abnormally low  (H): Data is abnormally high    Smear 6/20/22  Peripheral blood, morphology:  - Mild thrombocytopenia.  - Hemoglobin quantitatively within normal limits and erythrocytes with mild polychromasia.  - WBC subsets quantitatively within normal limits and without diagnostic morphologic abnormality.  - Negative for schistocytes.  - Negative for overt features of dysplasia or circulating blasts.      Assessment and Plan:  1. Thrombocytopenia of Pregnancy  Clinically doing well. Labs reviewed: Plt improved to 113, remainder of CBC stable. LFTs WNL except alk phos which is expected in late pregnancy. No signs of hemolysis. Smear stable. Reticulocytosis stable.    Will continue weekly lab monitoring until delivery. She will continue PO iron and B12 supplement. Previously received IV venofer x 3 May 2022.     15 minutes spent on the date of the encounter doing chart review, review of test results, interpretation of tests, patient visit and documentation     Pablito Gao PA-C  Department of Hematology and Oncology  [facility]

## 2022-06-22 NOTE — LETTER
2022         RE: Allison Henao  [address] [facility] [address]      Allison is a 42 year old who is being evaluated via a billable video visit.      How would you like to obtain your AVS? MyChart  If the video visit is dropped, the invitation should be resent by: Text to cell phone: [phone]  Will anyone else be joining your video visit? No     Sherley GUERIN        Could not get  on video visit. Converted to telephone visit: Jacob castaneda    Oncology/Hematology Visit Note  2022    Reason for Visit: Follow up of thrombocytopenia affecting pregnancy     History of Present Illness: Allison Henao is a  42 year old female who is referred for anemia and thrombocytopenia in pregnancy. Past medical history is significant for ovarian cyst, prediabetes, HLD. She is Mohawk-speaking and history is obtained via virtual .      She is originally from [facility] and [place] the [place]. She has delivered her two children in [facility] without CBC abnormality, both alive and well. Both were delivered vaginally without excessive bleed. [place] has had one miscarriage in 2017 at 6 weeks gestation. She had vaginal bleeding for 10 days.     Work-up consistent with iron deficiency anemia due to pregnancy (given Venofer 300mg weekly x 3), mild B12 deficiency on PO supplement. Normal renal function, LFTs, LDH, haptoglobin, folate, coags, peripheral smear. Overall felt thrombocytopenia is physiologic.      We have been doing weekly lab monitoring     Interval History:  Allison is doing well. She has some leg soreness and dizziness in the AM that improves throughout the day. She pricilla any bleeding or bruising. No fevers, abdominal pain, headaches.     Current Outpatient Medications   Medication Sig Dispense Refill     cyanocobalamin (VITAMIN B-12) 1000 MCG tablet Take 1 tablet (1,000 mcg) by mouth daily 30 tablet 3     ferrous sulfate 140 (45 Fe) MG TBCR CR  tablet Take 140 mg by mouth daily       Prenatal Vit-Fe Fumarate-FA (PRENATAL VITAMIN) 27-0.8 MG TABS Take 1 tablet by mouth daily 90 tablet 3       Past Medical History  Past Medical History:   Diagnosis Date     Ovarian cyst      Past Surgical History:   Procedure Laterality Date     ovarian cystectomy  09/21/2018     No Known Allergies  Social History   Social History     Tobacco Use     Smoking status: Never Smoker     Smokeless tobacco: Never Used   Vaping Use     Vaping Use: Never used   Substance Use Topics     Alcohol use: Not Currently     Drug use: Never      Past medical history and social history were reviewed.    Physical Examination:  LMP 10/17/2021   Wt Readings from Last 10 Encounters:   06/16/22 78.1 kg (172 lb 3.2 oz)   06/10/22 78 kg (172 lb)   06/08/22 77.6 kg (171 lb)   06/02/22 78 kg (172 lb)   06/01/22 77.8 kg (171 lb 9.6 oz)   05/24/22 77.6 kg (171 lb)   05/10/22 76.8 kg (169 lb 4.8 oz)   05/04/22 75.9 kg (167 lb 6.4 oz)   04/26/22 75.3 kg (166 lb)   04/06/22 73.9 kg (162 lb 14.4 oz)     Unable to do physical exam 2/2 telephone visit. Well sounding in no distress. Normal speech and thought process. Good voice quality. No audible wheezing or cough.    Laboratory Data:   Latest Reference Range & Units 06/20/22 15:43   Sodium 133 - 144 mmol/L 138   Potassium 3.4 - 5.3 mmol/L 3.9   Chloride 94 - 109 mmol/L 107   Carbon Dioxide 20 - 32 mmol/L 21   Urea Nitrogen 7 - 30 mg/dL 13   Creatinine 0.52 - 1.04 mg/dL 0.47 (L)   GFR Estimate >60 mL/min/1.73m2 >90   Calcium 8.5 - 10.1 mg/dL 8.8   Anion Gap 3 - 14 mmol/L 10   Albumin 3.4 - 5.0 g/dL 2.7 (L)   Protein Total 6.8 - 8.8 g/dL 6.7 (L)   Bilirubin Total 0.2 - 1.3 mg/dL 0.3   Alkaline Phosphatase 40 - 150 U/L 188 (H)   ALT 0 - 50 U/L 26   AST 0 - 45 U/L 20   Lactate Dehydrogenase 81 - 234 U/L 165   Glucose 70 - 99 mg/dL 99   WBC 4.0 - 11.0 10e3/uL 7.4   Hemoglobin 11.7 - 15.7 g/dL 12.0   Hematocrit 35.0 - 47.0 % 35.9   Platelet Count 150 - 450 10e3/uL  113 (L)   RBC Count 3.80 - 5.20 10e6/uL 4.03   MCV 78 - 100 fL 89   MCH 26.5 - 33.0 pg 29.8   MCHC 31.5 - 36.5 g/dL 33.4   RDW 10.0 - 15.0 % 13.4   % Neutrophils % 79   % Lymphocytes % 15   % Monocytes % 5   % Eosinophils % 1   % Basophils % 0   Absolute Basophils 0.0 - 0.2 10e3/uL 0.0   Absolute Eosinophils 0.0 - 0.7 10e3/uL 0.0   Absolute Immature Granulocytes <=0.4 10e3/uL 0.0   Absolute Lymphocytes 0.8 - 5.3 10e3/uL 1.1   Absolute Monocytes 0.0 - 1.3 10e3/uL 0.4   % Immature Granulocytes % 0   Absolute Neutrophils 1.6 - 8.3 10e3/uL 5.8   % Retic 0.5 - 2.0 % 2.5 (H)   Absolute Retic 0.025 - 0.095 10e6/uL 0.097 (H)   INR 0.85 - 1.15  0.98   PTT 22 - 38 Seconds 35   Fibrinogen 170 - 490 mg/dL 407   Haptoglobin 32 - 197 mg/dL 128   (L): Data is abnormally low  (H): Data is abnormally high    Smear 6/20/22  Peripheral blood, morphology:  - Mild thrombocytopenia.  - Hemoglobin quantitatively within normal limits and erythrocytes with mild polychromasia.  - WBC subsets quantitatively within normal limits and without diagnostic morphologic abnormality.  - Negative for schistocytes.  - Negative for overt features of dysplasia or circulating blasts.      Assessment and Plan:  1. Thrombocytopenia of Pregnancy  Clinically doing well. Labs reviewed: Plt improved to 113, remainder of CBC stable. LFTs WNL except alk phos which is expected in late pregnancy. No signs of hemolysis. Smear stable. Reticulocytosis stable.    Will continue weekly lab monitoring until delivery. She will continue PO iron and B12 supplement. Previously received IV venofer x 3 May 2022.     15 minutes spent on the date of the encounter doing chart review, review of test results, interpretation of tests, patient visit and documentation     Pablito Gao PA-C  Department of Hematology and Oncology  [facility]           LIDIA Card

## 2022-06-24 ENCOUNTER — PRENATAL OFFICE VISIT (OUTPATIENT)
Dept: OBGYN | Facility: CLINIC | Age: 42
End: 2022-06-24
Payer: COMMERCIAL

## 2022-06-24 VITALS — BODY MASS INDEX: 32.96 KG/M2 | SYSTOLIC BLOOD PRESSURE: 120 MMHG | WEIGHT: 173 LBS | DIASTOLIC BLOOD PRESSURE: 70 MMHG

## 2022-06-24 DIAGNOSIS — M54.50 ACUTE MIDLINE LOW BACK PAIN WITHOUT SCIATICA: ICD-10-CM

## 2022-06-24 DIAGNOSIS — M25.551 HIP PAIN, RIGHT: ICD-10-CM

## 2022-06-24 DIAGNOSIS — O09.899 SUPERVISION OF OTHER HIGH RISK PREGNANCY, ANTEPARTUM: Primary | ICD-10-CM

## 2022-06-24 PROCEDURE — 99207 PR PRENATAL VISIT: CPT | Performed by: OBSTETRICS & GYNECOLOGY

## 2022-06-24 PROCEDURE — 87653 STREP B DNA AMP PROBE: CPT | Performed by: OBSTETRICS & GYNECOLOGY

## 2022-06-24 NOTE — PROGRESS NOTES
Routine obstetric visit at 35w5d  Estimated Date of Delivery: Jul 24, 2022     Issues:   1. PNC:    - low back and right hip pain, desires physical therapy referral, placed.   2. AMA: level II within normal limits other than circumvallate placenta  3. Thrombocytopenia: s/p Heme consult, ITP vs gest thrombocytopenia. Improved from plt 106->113.   4. Circumvallate placenta:   5. Positive LEEANN titer 1:160. Discussed with MFM, keep growth US as scheduled, no other follow up needed.  6. Mild anemia, on iron.  7. AMA: plan 39w IOL    Return to clinic weekly    Natalie Soliz MD

## 2022-06-24 NOTE — NURSING NOTE
"Chief Complaint   Patient presents with     Prenatal Care       Initial /70   Wt 78.5 kg (173 lb)   LMP 10/17/2021   Breastfeeding No   BMI 32.96 kg/m   Estimated body mass index is 32.96 kg/m  as calculated from the following:    Height as of 22: 1.543 m (5' 0.75\").    Weight as of this encounter: 78.5 kg (173 lb).  BP completed using cuff size: regular    Questioned patient about current smoking habits.  Pt. has never smoked.          35w5d  + FM daily  + edema  - bleeding  - ctx  Ayla Diego LPN               "

## 2022-06-25 LAB — GP B STREP DNA SPEC QL NAA+PROBE: NEGATIVE

## 2022-06-27 ENCOUNTER — LAB (OUTPATIENT)
Dept: LAB | Facility: CLINIC | Age: 42
End: 2022-06-27
Payer: COMMERCIAL

## 2022-06-27 DIAGNOSIS — D69.6 THROMBOCYTOPENIA AFFECTING PREGNANCY (H): ICD-10-CM

## 2022-06-27 DIAGNOSIS — O99.119 THROMBOCYTOPENIA AFFECTING PREGNANCY (H): ICD-10-CM

## 2022-06-27 LAB
BASOPHILS # BLD AUTO: 0 10E3/UL (ref 0–0.2)
BASOPHILS NFR BLD AUTO: 0 %
EOSINOPHIL # BLD AUTO: 0.1 10E3/UL (ref 0–0.7)
EOSINOPHIL NFR BLD AUTO: 1 %
ERYTHROCYTE [DISTWIDTH] IN BLOOD BY AUTOMATED COUNT: 13.5 % (ref 10–15)
HCT VFR BLD AUTO: 36.4 % (ref 35–47)
HGB BLD-MCNC: 12.2 G/DL (ref 11.7–15.7)
IMM GRANULOCYTES # BLD: 0 10E3/UL
IMM GRANULOCYTES NFR BLD: 0 %
LDH SERPL L TO P-CCNC: 153 U/L (ref 81–234)
LYMPHOCYTES # BLD AUTO: 1.2 10E3/UL (ref 0.8–5.3)
LYMPHOCYTES NFR BLD AUTO: 15 %
MCH RBC QN AUTO: 29.8 PG (ref 26.5–33)
MCHC RBC AUTO-ENTMCNC: 33.5 G/DL (ref 31.5–36.5)
MCV RBC AUTO: 89 FL (ref 78–100)
MONOCYTES # BLD AUTO: 0.4 10E3/UL (ref 0–1.3)
MONOCYTES NFR BLD AUTO: 5 %
NEUTROPHILS # BLD AUTO: 5.9 10E3/UL (ref 1.6–8.3)
NEUTROPHILS NFR BLD AUTO: 78 %
PLATELET # BLD AUTO: 109 10E3/UL (ref 150–450)
RBC # BLD AUTO: 4.09 10E6/UL (ref 3.8–5.2)
RETICS # AUTO: 0.11 10E6/UL (ref 0.03–0.1)
RETICS/RBC NFR AUTO: 2.6 % (ref 0.5–2)
WBC # BLD AUTO: 7.5 10E3/UL (ref 4–11)

## 2022-06-27 PROCEDURE — 80053 COMPREHEN METABOLIC PANEL: CPT

## 2022-06-27 PROCEDURE — 85610 PROTHROMBIN TIME: CPT

## 2022-06-27 PROCEDURE — 85045 AUTOMATED RETICULOCYTE COUNT: CPT

## 2022-06-27 PROCEDURE — 36415 COLL VENOUS BLD VENIPUNCTURE: CPT

## 2022-06-27 PROCEDURE — 83615 LACTATE (LD) (LDH) ENZYME: CPT

## 2022-06-27 PROCEDURE — 83010 ASSAY OF HAPTOGLOBIN QUANT: CPT

## 2022-06-27 PROCEDURE — 85384 FIBRINOGEN ACTIVITY: CPT

## 2022-06-27 PROCEDURE — 85730 THROMBOPLASTIN TIME PARTIAL: CPT

## 2022-06-27 PROCEDURE — 85025 COMPLETE CBC W/AUTO DIFF WBC: CPT

## 2022-06-28 LAB
APTT PPP: 38 SECONDS (ref 22–38)
FIBRINOGEN PPP-MCNC: 541 MG/DL (ref 170–490)
HAPTOGLOB SERPL-MCNC: 147 MG/DL (ref 32–197)
INR PPP: 0.98 (ref 0.85–1.15)

## 2022-06-28 PROCEDURE — 85060 BLOOD SMEAR INTERPRETATION: CPT | Performed by: PATHOLOGY

## 2022-06-29 LAB
ALBUMIN SERPL-MCNC: 2.6 G/DL (ref 3.4–5)
ALP SERPL-CCNC: 212 U/L (ref 40–150)
ALT SERPL W P-5'-P-CCNC: 24 U/L (ref 0–50)
ANION GAP SERPL CALCULATED.3IONS-SCNC: 7 MMOL/L (ref 3–14)
AST SERPL W P-5'-P-CCNC: 19 U/L (ref 0–45)
BILIRUB SERPL-MCNC: 0.4 MG/DL (ref 0.2–1.3)
BUN SERPL-MCNC: 11 MG/DL (ref 7–30)
CALCIUM SERPL-MCNC: 9.2 MG/DL (ref 8.5–10.1)
CHLORIDE BLD-SCNC: 109 MMOL/L (ref 94–109)
CO2 SERPL-SCNC: 23 MMOL/L (ref 20–32)
CREAT SERPL-MCNC: 0.35 MG/DL (ref 0.52–1.04)
GFR SERPL CREATININE-BSD FRML MDRD: >90 ML/MIN/1.73M2
GLUCOSE BLD-MCNC: 90 MG/DL (ref 70–99)
POTASSIUM BLD-SCNC: 4 MMOL/L (ref 3.4–5.3)
PROT SERPL-MCNC: 6.6 G/DL (ref 6.8–8.8)
SODIUM SERPL-SCNC: 139 MMOL/L (ref 133–144)

## 2022-06-30 ENCOUNTER — HOSPITAL ENCOUNTER (OUTPATIENT)
Dept: ULTRASOUND IMAGING | Facility: CLINIC | Age: 42
Discharge: HOME | End: 2022-06-30
Attending: OBSTETRICS & GYNECOLOGY
Payer: COMMERCIAL

## 2022-06-30 ENCOUNTER — OFFICE VISIT (OUTPATIENT)
Dept: MATERNAL FETAL MEDICINE | Facility: CLINIC | Age: 42
End: 2022-06-30
Attending: OBSTETRICS & GYNECOLOGY
Payer: COMMERCIAL

## 2022-06-30 DIAGNOSIS — O09.522 AMA (ADVANCED MATERNAL AGE) MULTIGRAVIDA 35+, SECOND TRIMESTER: ICD-10-CM

## 2022-06-30 DIAGNOSIS — D69.6 THROMBOCYTOPENIA AFFECTING PREGNANCY (H): Primary | ICD-10-CM

## 2022-06-30 DIAGNOSIS — O99.119 THROMBOCYTOPENIA AFFECTING PREGNANCY (H): Primary | ICD-10-CM

## 2022-06-30 DIAGNOSIS — O09.522 AMA (ADVANCED MATERNAL AGE) MULTIGRAVIDA 35+, SECOND TRIMESTER: Primary | ICD-10-CM

## 2022-06-30 PROCEDURE — 76819 FETAL BIOPHYS PROFIL W/O NST: CPT | Mod: 26 | Performed by: OBSTETRICS & GYNECOLOGY

## 2022-06-30 PROCEDURE — 76819 FETAL BIOPHYS PROFIL W/O NST: CPT

## 2022-06-30 PROCEDURE — 76815 OB US LIMITED FETUS(S): CPT | Mod: 26 | Performed by: OBSTETRICS & GYNECOLOGY

## 2022-06-30 NOTE — PROGRESS NOTES
"Please see \"Imaging\" tab under \"Chart Review\" for details of today's ultrasound.    Sunny Elizalde M.D.  Specialist in Maternal-Fetal Medicine     "

## 2022-07-01 ENCOUNTER — THERAPY VISIT (OUTPATIENT)
Dept: PHYSICAL THERAPY | Facility: CLINIC | Age: 42
End: 2022-07-01
Attending: OBSTETRICS & GYNECOLOGY
Payer: COMMERCIAL

## 2022-07-01 ENCOUNTER — PRENATAL OFFICE VISIT (OUTPATIENT)
Dept: OBGYN | Facility: CLINIC | Age: 42
End: 2022-07-01
Payer: COMMERCIAL

## 2022-07-01 DIAGNOSIS — M25.551 HIP PAIN, RIGHT: ICD-10-CM

## 2022-07-01 DIAGNOSIS — R10.2 PAIN OF PELVIC GIRDLE: ICD-10-CM

## 2022-07-01 DIAGNOSIS — O09.529 SUPERVISION OF HIGH-RISK PREGNANCY OF ELDERLY MULTIGRAVIDA: Primary | ICD-10-CM

## 2022-07-01 DIAGNOSIS — O09.899 SUPERVISION OF OTHER HIGH RISK PREGNANCY, ANTEPARTUM: ICD-10-CM

## 2022-07-01 DIAGNOSIS — M54.50 ACUTE MIDLINE LOW BACK PAIN WITHOUT SCIATICA: ICD-10-CM

## 2022-07-01 DIAGNOSIS — D69.6 THROMBOCYTOPENIA AFFECTING PREGNANCY (H): ICD-10-CM

## 2022-07-01 DIAGNOSIS — O99.119 THROMBOCYTOPENIA AFFECTING PREGNANCY (H): ICD-10-CM

## 2022-07-01 PROCEDURE — 97110 THERAPEUTIC EXERCISES: CPT | Mod: GP | Performed by: PHYSICAL THERAPIST

## 2022-07-01 PROCEDURE — 99207 PR PRENATAL VISIT: CPT | Performed by: OBSTETRICS & GYNECOLOGY

## 2022-07-01 PROCEDURE — 97161 PT EVAL LOW COMPLEX 20 MIN: CPT | Mod: GP | Performed by: PHYSICAL THERAPIST

## 2022-07-01 PROCEDURE — 97535 SELF CARE MNGMENT TRAINING: CPT | Mod: GP | Performed by: PHYSICAL THERAPIST

## 2022-07-01 NOTE — PROGRESS NOTES
Physical Therapy Initial Evaluation  Subjective:  The history is provided by the patient. The history is limited by a language barrier. A  was used (iPad).   Patient Health History  Allison Henao being seen for Low back pain in pregnancy .     Problem began: 6/24/2022 (MD visit).   Problem occurred: pregnancy    Pain is reported as 9/10 on pain scale.  General health as reported by patient is good.  Pertinent medical history includes: currently pregnant.   Red flags:  None as reported by patient.  Medical allergies: none.       Current medications:  None.    Current occupation is [employer] .   Primary job tasks include:  Prolonged standing.                  Therapist Generated HPI Evaluation  Problem details: Pt endorses R leg, hip pain, and low back pain, groin area on R side. Pt has been in pain for a few weeks, currently 35 weeks pregnant with her third baby, getting induced in two weeks. Pt with pubic symphysis and SIJ pain, gluteal tenderness. .         Type of problem:  Lumbar.    This is a new condition.        and is constant.  Pain radiates to:  Gluteals right and thigh right.   Since onset symptoms are gradually worsening.  Symptoms are exacerbated by walking  and relieved by rest.      Restrictions due to condition include:  Working in normal job with restrictions (spending more time in sitting ).                          Objective:  System         Lumbar/SI Evaluation  ROM:  AROM Lumbar: normal                Lumbar Palpation:  Palpation (lumbar): pubic symphysis.    Tenderness present at Right: PSIS; Gluteus Medius and Greater Trochanter                                                     General     ROS    Assessment/Plan:    Patient is a 42 year old female with lumbar and pelvic complaints.    Patient has the following significant findings with corresponding treatment plan.                Diagnosis 1:  LBP and R hip pain with pregnancy   Pain -  self management,  education and home program  Decreased strength - therapeutic exercise, therapeutic activities and home program  Decreased function - therapeutic activities and home program  Impaired posture - neuro re-education and home program  Instability -  Therapeutic Activity  Therapeutic Exercise  home program      Cumulative Therapy Evaluation is: Low complexity.    Previous and current functional limitations:  (See Goal Flow Sheet for this information)    Short term and Long term goals: (See Goal Flow Sheet for this information)     Communication ability:  Patient appears to be able to clearly communicate and understand verbal and written communication and follow directions correctly.  Treatment Explanation - The following has been discussed with the patient:   RX ordered/plan of care  Anticipated outcomes  Possible risks and side effects  This patient would benefit from PT intervention to resume normal activities.   Rehab potential is good.    Frequency:  1 X week, once daily  Duration:  for 8 weeks  Discharge Plan:  Achieve all LTG.  Independent in home treatment program.  Reach maximal therapeutic benefit.    Please refer to the daily flowsheet for treatment today, total treatment time and time spent performing 1:1 timed codes.

## 2022-07-01 NOTE — PROGRESS NOTES
Routine obstetric visit at 36w5d  Estimated Date of Delivery: Jul 24, 2022   Visit conducted with .    Issues:   1. PNC:    - low back and right hip pain, desires physical therapy referral, placed.   2. AMA: level II within normal limits other than circumvallate placenta  3. Thrombocytopenia: s/p Heme consult, ITP vs gest thrombocytopenia. Improved from plt 106->113.   4. Circumvallate placenta:   5. Positive LEEANN titer 1:160. Discussed with MFM, keep growth US as scheduled, no other follow up needed.  6. Mild anemia, on iron.  7. AMA: plan 39w IOL. Millan score of 5 today. Anticipate further spontaneous ripening prior to IOL and therefore anticipate morning IOL with AROM and pitocin.  8. PP: considering tubal ligation, likely not immediate. Has used natural family planning in the past.     Return to clinic weekly through delivery. Reviewed labor warning signs.     Natalie Soliz MD

## 2022-07-05 ENCOUNTER — LAB (OUTPATIENT)
Dept: LAB | Facility: CLINIC | Age: 42
End: 2022-07-05
Payer: COMMERCIAL

## 2022-07-05 DIAGNOSIS — D69.6 THROMBOCYTOPENIA AFFECTING PREGNANCY (H): ICD-10-CM

## 2022-07-05 DIAGNOSIS — O99.119 THROMBOCYTOPENIA AFFECTING PREGNANCY (H): ICD-10-CM

## 2022-07-05 LAB
BASOPHILS # BLD AUTO: 0 10E3/UL (ref 0–0.2)
BASOPHILS NFR BLD AUTO: 0 %
EOSINOPHIL # BLD AUTO: 0 10E3/UL (ref 0–0.7)
EOSINOPHIL NFR BLD AUTO: 0 %
ERYTHROCYTE [DISTWIDTH] IN BLOOD BY AUTOMATED COUNT: 13.5 % (ref 10–15)
HCT VFR BLD AUTO: 35.7 % (ref 35–47)
HGB BLD-MCNC: 12 G/DL (ref 11.7–15.7)
IMM GRANULOCYTES # BLD: 0 10E3/UL
IMM GRANULOCYTES NFR BLD: 0 %
LDH SERPL L TO P-CCNC: 166 U/L (ref 81–234)
LYMPHOCYTES # BLD AUTO: 1.3 10E3/UL (ref 0.8–5.3)
LYMPHOCYTES NFR BLD AUTO: 19 %
MCH RBC QN AUTO: 29.9 PG (ref 26.5–33)
MCHC RBC AUTO-ENTMCNC: 33.6 G/DL (ref 31.5–36.5)
MCV RBC AUTO: 89 FL (ref 78–100)
MONOCYTES # BLD AUTO: 0.3 10E3/UL (ref 0–1.3)
MONOCYTES NFR BLD AUTO: 5 %
NEUTROPHILS # BLD AUTO: 5 10E3/UL (ref 1.6–8.3)
NEUTROPHILS NFR BLD AUTO: 75 %
PLATELET # BLD AUTO: 109 10E3/UL (ref 150–450)
RBC # BLD AUTO: 4.01 10E6/UL (ref 3.8–5.2)
RETICS # AUTO: 0.1 10E6/UL (ref 0.03–0.1)
RETICS/RBC NFR AUTO: 2.5 % (ref 0.5–2)
WBC # BLD AUTO: 6.7 10E3/UL (ref 4–11)

## 2022-07-05 PROCEDURE — 80053 COMPREHEN METABOLIC PANEL: CPT

## 2022-07-05 PROCEDURE — 85610 PROTHROMBIN TIME: CPT

## 2022-07-05 PROCEDURE — 83010 ASSAY OF HAPTOGLOBIN QUANT: CPT

## 2022-07-05 PROCEDURE — 85025 COMPLETE CBC W/AUTO DIFF WBC: CPT

## 2022-07-05 PROCEDURE — 85045 AUTOMATED RETICULOCYTE COUNT: CPT

## 2022-07-05 PROCEDURE — 85730 THROMBOPLASTIN TIME PARTIAL: CPT

## 2022-07-05 PROCEDURE — 85060 BLOOD SMEAR INTERPRETATION: CPT | Performed by: PATHOLOGY

## 2022-07-05 PROCEDURE — 36415 COLL VENOUS BLD VENIPUNCTURE: CPT

## 2022-07-05 PROCEDURE — 85384 FIBRINOGEN ACTIVITY: CPT

## 2022-07-05 PROCEDURE — 83615 LACTATE (LD) (LDH) ENZYME: CPT

## 2022-07-05 NOTE — PROGRESS NOTES
[facility]    Hematology/Oncology Established Patient Follow-up Note    Treatment Summary:      Today's Date: 22    Reason for Follow-up: Thrombocytopenia affecting pregnancy  Referring Provider: Natalie Soliz MD      HISTORY OF PRESENT ILLNESS: Allison Henao is a  42 year old female who is referred for anemia and thrombocytopenia in pregnancy. She is currently 32 weeks gestation. Past medical history is significant for ovarian cyst, prediabetes, HLD. She is Sami-speaking and history is obtained via virtual .     She is originally from [facility] [place] and emigrated to the  in 2007. She has delivered her two children in [facility] without CBC abnormality, both alive and well. Both were delivered vaginally without excessive bleed. [place], [place] has had one miscarriage in  at 6 weeks gestation. She had vaginal bleeding for 10 days.     Since , WBC has ranged 5.9-8.4, hemoglobin has decreased from 12.8 to 10.7 (MCV 80s), platelets have ranged 109-178. On 22, ferritin 10, TIBC 512, iron sat 13%. HIV negative. Hep B surface antigen negative. She takes iron and prenatal vitamin.     No epistaxis, gingival bleed, petechiae, hematuria/hematochezia/melena, or vaginal bleed. LMP was 2021. Planned for vaginal delivery 22.     She denies malar rash. She has intermittent arthralgias, but in the shoulders. 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       INTERIM HISTORY:  No epistaxis, gingival bleed, vaginal bleed, hematuria, hematochezia/melena. Dark stools with iron supplementation. Taking B12 daily at night.    CBC 22 with PLT 109K. LFTs and Cr WNL. Coag studies normal.      She is planned for induction on 22 due to thrombocytopenia and anemia.      REVIEW OF SYSTEMS:   A 14 point ROS was reviewed with pertinent positives and negatives in the HPI.       HOME MEDICATIONS:  Current Outpatient Medications   Medication Sig Dispense  Refill     cyanocobalamin (VITAMIN B-12) 1000 MCG tablet Take 1 tablet (1,000 mcg) by mouth daily 30 tablet 3     ferrous sulfate 140 (45 Fe) MG TBCR CR tablet Take 140 mg by mouth daily       Prenatal Vit-Fe Fumarate-FA (PRENATAL VITAMIN) 27-0.8 MG TABS Take 1 tablet by mouth daily 90 tablet 3         ALLERGIES:  No Known Allergies      PAST MEDICAL HISTORY:  Past Medical History:   Diagnosis Date     Ovarian cyst          PAST SURGICAL HISTORY:  Past Surgical History:   Procedure Laterality Date     ovarian cystectomy  09/21/2018         SOCIAL HISTORY:  Social History     Socioeconomic History     Marital status:      Spouse name: Not on file     Number of children: Not on file     Years of education: Not on file     Highest education level: Not on file   Occupational History     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, [employer]   Tobacco Use     Smoking status: Never Smoker     Smokeless tobacco: Never Used   Vaping Use     Vaping Use: Never used   Substance and Sexual Activity     Alcohol use: Not Currently     Drug use: Never     Sexual activity: Yes     Partners: Male   Other Topics Concern     Not on file   Social History Narrative     Not on file     Social Determinants of Health     Financial Resource Strain: Not on file   Food Insecurity: Not on file   Transportation Needs: Not on file   Physical Activity: Not on file   Stress: Not on file   Social Connections: Not on file   Intimate Partner Violence: Not At Risk     Fear of Current or Ex-Partner: No     Emotionally Abused: No     Physically Abused: No     Sexually Abused: No   Housing Stability: Not on file         FAMILY HISTORY:  Family History   Problem Relation Age of Onset     Thyroid Disease Mother      Pacemaker Mother      Pacemaker Father          PHYSICAL EXAM:  Vital signs:  /72 (Cuff Size: Adult Regular)   Pulse 72   Temp 97.3  F (36.3  C) (Tympanic)   Resp 16   Wt 79.8 kg (176 lb)   LMP 10/17/2021   SpO2 98%   BMI 33.53 kg/m      GENERAL/CONSTITUTIONAL: No acute distress.  EYES: Pupils are equal, round, and react to light and accommodation. Extraocular movements intact.  No scleral icterus.  ENT/MOUTH: Neck supple. Oropharynx clear, no mucositis.  LYMPH: No anterior cervical, posterior cervical, supraclavicular, axillary or inguinal adenopathy.   RESPIRATORY: Clear to auscultation bilaterally. No crackles or wheezing.   CARDIOVASCULAR: Regular rate and rhythm without murmurs, gallops, or rubs.  GASTROINTESTINAL: No hepatosplenomegaly, masses, or tenderness. The patient has normal bowel sounds. No guarding.  No distention. Gravid abdomen.  MUSCULOSKELETAL: Warm and well-perfused, no cyanosis, clubbing, or edema.  NEUROLOGIC: Cranial nerves II-XII are intact. Alert, oriented, answers questions appropriately.  INTEGUMENTARY: No rashes or jaundice.  GAIT: Steady, does not use assistive device      LABS:  CBC RESULTS:   Recent Labs   Lab Test 22  1443   WBC 7.5   RBC 4.09   HGB 12.2   HCT 36.4   MCV 89   MCH 29.8   MCHC 33.5   RDW 13.5   *       Recent Labs   Lab Test 22  1443 22  1543    138   POTASSIUM 4.0 3.9   CHLORIDE 109 107   CO2 23 21   ANIONGAP 7 10   GLC 90 99   BUN 11 13   CR 0.35* 0.47*   DENITA 9.2 8.8         PATHOLOGY:  Final Diagnosis 3/28/22:   A.  Peripheral blood smear for morphology:  - Isolated, borderline thrombocytopenia.            IMAGING:  N/A     ASSESSMENT/PLAN:  Allison Henao is a  42 year old female who is referred for anemia and thrombocytopenia in pregnancy. She is currently 32 weeks gestation. Past medical history is significant for ovarian cyst, prediabetes, HLD. She is Mongolian-speaking and history is obtained via [employer] . Induction is planned for 22. Her actual due date is 22.     Discussed with patient her past medical history, previous pregnancies, and current laboratory findings. She has evidence of iron deficiency anemia due to pregnancy and will  supplement with venofer 300 mg weekly x3 weeks. She should continue daily iron, VitC, and prenatal vitamin.      For thrombocytopenia, differential includes gestational thrombocytopenia as platelets continue to be >100K. However, we discussed the possibility of ITP and need to monitor for HELLP/DIC. We discussed the possibility of steroid pulse or IVIG in the future.      Repeat CBC returned with PLT of 147K. WBC and Hb WNL. Renal function and LFTs WNL. LDH and haptoglobin WNL. Folate 36.1. B12 borderline at 227 and started on supplementation. Coag studies WNL. Peripheral smear with isolated, borderline thrombocytopenia. Otherwise, no other abnormalities.     She is seeing OB/GYN weekly. She will see Dr. Soliz at the end of the month.      -Patient's thrombocytopenia is most likely due to gestational thrombocytopenia (physiologic).   -Continue once daily ferrous sulfate, VitC, B12 supplementation.  -s/p venofer x3 5/9-5/23/22.  -Patient is due for induction on 7/18/22.  -Repeat labs next week and will contact patient if platelets <100K.  -Repeat CBC in 1 month with clinic follow up.      Thank you referring Mrs. Henao to clinic. Adequate time was dedicated to patient questions and answered to the expressed satisfaction of the patient.           Zari Roque, DO  Hematology/Oncology  [facility]

## 2022-07-06 ENCOUNTER — HOSPITAL ENCOUNTER (OUTPATIENT)
Dept: ULTRASOUND IMAGING | Facility: CLINIC | Age: 42
Discharge: HOME | End: 2022-07-06
Attending: OBSTETRICS & GYNECOLOGY
Payer: COMMERCIAL

## 2022-07-06 ENCOUNTER — ONCOLOGY VISIT (OUTPATIENT)
Dept: ONCOLOGY | Facility: CLINIC | Age: 42
End: 2022-07-06
Attending: INTERNAL MEDICINE
Payer: COMMERCIAL

## 2022-07-06 ENCOUNTER — OFFICE VISIT (OUTPATIENT)
Dept: MATERNAL FETAL MEDICINE | Facility: CLINIC | Age: 42
End: 2022-07-06
Attending: OBSTETRICS & GYNECOLOGY
Payer: COMMERCIAL

## 2022-07-06 VITALS
SYSTOLIC BLOOD PRESSURE: 113 MMHG | OXYGEN SATURATION: 98 % | WEIGHT: 176 LBS | HEART RATE: 72 BPM | DIASTOLIC BLOOD PRESSURE: 72 MMHG | RESPIRATION RATE: 16 BRPM | BODY MASS INDEX: 33.53 KG/M2 | TEMPERATURE: 97.3 F

## 2022-07-06 DIAGNOSIS — O99.119 THROMBOCYTOPENIA AFFECTING PREGNANCY (H): Primary | ICD-10-CM

## 2022-07-06 DIAGNOSIS — O09.523 AMA (ADVANCED MATERNAL AGE) MULTIGRAVIDA 35+, THIRD TRIMESTER: Primary | ICD-10-CM

## 2022-07-06 DIAGNOSIS — D69.6 THROMBOCYTOPENIA AFFECTING PREGNANCY (H): Primary | ICD-10-CM

## 2022-07-06 DIAGNOSIS — O09.522 AMA (ADVANCED MATERNAL AGE) MULTIGRAVIDA 35+, SECOND TRIMESTER: ICD-10-CM

## 2022-07-06 LAB
ALBUMIN SERPL-MCNC: 2.6 G/DL (ref 3.4–5)
ALP SERPL-CCNC: 223 U/L (ref 40–150)
ALT SERPL W P-5'-P-CCNC: 28 U/L (ref 0–50)
ANION GAP SERPL CALCULATED.3IONS-SCNC: 12 MMOL/L (ref 3–14)
APTT PPP: 36 SECONDS (ref 22–38)
AST SERPL W P-5'-P-CCNC: 24 U/L (ref 0–45)
BILIRUB SERPL-MCNC: 0.4 MG/DL (ref 0.2–1.3)
BUN SERPL-MCNC: 10 MG/DL (ref 7–30)
CALCIUM SERPL-MCNC: 8.4 MG/DL (ref 8.5–10.1)
CHLORIDE BLD-SCNC: 106 MMOL/L (ref 94–109)
CO2 SERPL-SCNC: 19 MMOL/L (ref 20–32)
CREAT SERPL-MCNC: 0.4 MG/DL (ref 0.52–1.04)
FIBRINOGEN PPP-MCNC: 446 MG/DL (ref 170–490)
GFR SERPL CREATININE-BSD FRML MDRD: >90 ML/MIN/1.73M2
GLUCOSE BLD-MCNC: 81 MG/DL (ref 70–99)
INR PPP: 0.98 (ref 0.85–1.15)
PATH REPORT.COMMENTS IMP SPEC: NORMAL
PATH REPORT.FINAL DX SPEC: NORMAL
PATH REPORT.FINAL DX SPEC: NORMAL
PATH REPORT.MICROSCOPIC SPEC OTHER STN: NORMAL
PATH REPORT.RELEVANT HX SPEC: NORMAL
PATH REPORT.RELEVANT HX SPEC: NORMAL
POTASSIUM BLD-SCNC: 3.6 MMOL/L (ref 3.4–5.3)
PROT SERPL-MCNC: 6.4 G/DL (ref 6.8–8.8)
SODIUM SERPL-SCNC: 137 MMOL/L (ref 133–144)

## 2022-07-06 PROCEDURE — 76819 FETAL BIOPHYS PROFIL W/O NST: CPT

## 2022-07-06 PROCEDURE — 76819 FETAL BIOPHYS PROFIL W/O NST: CPT | Mod: 26 | Performed by: OBSTETRICS & GYNECOLOGY

## 2022-07-06 PROCEDURE — 99213 OFFICE O/P EST LOW 20 MIN: CPT | Performed by: INTERNAL MEDICINE

## 2022-07-06 PROCEDURE — 76815 OB US LIMITED FETUS(S): CPT | Mod: 26 | Performed by: OBSTETRICS & GYNECOLOGY

## 2022-07-06 PROCEDURE — G0463 HOSPITAL OUTPT CLINIC VISIT: HCPCS

## 2022-07-06 ASSESSMENT — PAIN SCALES - GENERAL: PAINLEVEL: NO PAIN (0)

## 2022-07-06 NOTE — NURSING NOTE
"Oncology Rooming Note    July 6, 2022 3:37 PM   Allison Henao is a 42 year old female who presents for:    Chief Complaint   Patient presents with     Oncology Clinic Visit     Thrombocytopenia affecting pregnancy     Initial Vitals: /72 (Cuff Size: Adult Regular)   Pulse 72   Temp 97.3  F (36.3  C) (Tympanic)   Resp 16   Wt 79.8 kg (176 lb)   LMP 10/17/2021   SpO2 98%   BMI 33.53 kg/m   Estimated body mass index is 33.53 kg/m  as calculated from the following:    Height as of 5/4/22: 1.543 m (5' 0.75\").    Weight as of this encounter: 79.8 kg (176 lb). Body surface area is 1.85 meters squared.  No Pain (0) Comment: Data Unavailable   Patient's last menstrual period was 10/17/2021.  Allergies reviewed: Yes  Medications reviewed: Yes    Medications: Medication refills not needed today.  Pharmacy [facility] entered into [facility]: [id number] - [facility] [address] AT [place] [address]    Clinical concerns: f/u       Sana Busch, HEATHER              "

## 2022-07-06 NOTE — Clinical Note
2022         RE: Allison Henao  [id number] [address] [id number]        Dear Colleague,    Thank you for referring your patient, Allison Henao, to the [facility] [place]. Please see a copy of my visit note below.    [facility]    Hematology/Oncology Established Patient Follow-up Note    Treatment Summary:      Today's Date: 22    Reason for Follow-up: Thrombocytopenia affecting pregnancy  Referring Provider: Natalie Soliz MD      HISTORY OF PRESENT ILLNESS: Allison Henao is a  42 year old female who is referred for anemia and thrombocytopenia in pregnancy. She is currently 32 weeks gestation. Past medical history is significant for ovarian cyst, prediabetes, HLD. She is Maori-speaking and history is obtained via virtual .     She is originally from [place] and [place] the [place] in [place]. She has delivered her two children in [place] without CBC abnormality, both alive and well. Both were delivered vaginally without excessive bleed. [place] has had one miscarriage in  at 6 weeks gestation. She had vaginal bleeding for 10 days.     Since , WBC has ranged 5.9-8.4, hemoglobin has decreased from 12.8 to 10.7 (MCV 80s), platelets have ranged 109-178. On 22, ferritin 10, TIBC 512, iron sat 13%. HIV negative. Hep B surface antigen negative. She takes iron and prenatal vitamin.     No epistaxis, gingival bleed, petechiae, hematuria/hematochezia/melena, or vaginal bleed. LMP was 2021. Planned for vaginal delivery 22.     She denies malar rash. She has intermittent arthralgias, but in the shoulders. 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       INTERIM HISTORY:  No epistaxis, gingival bleed, vaginal bleed, hematuria, hematochezia/melena. Dark stools with iron supplementation. Taking B12 daily at night.    CBC 22 with PLT 109K. LFTs and Cr WNL. Coag studies normal.      She is  planned for induction on 7/18/22 due to thrombocytopenia and anemia.      REVIEW OF SYSTEMS:   A 14 point ROS was reviewed with pertinent positives and negatives in the HPI.       HOME MEDICATIONS:  Current Outpatient Medications   Medication Sig Dispense Refill     cyanocobalamin (VITAMIN B-12) 1000 MCG tablet Take 1 tablet (1,000 mcg) by mouth daily 30 tablet 3     ferrous sulfate 140 (45 Fe) MG TBCR CR tablet Take 140 mg by mouth daily       Prenatal Vit-Fe Fumarate-FA (PRENATAL VITAMIN) 27-0.8 MG TABS Take 1 tablet by mouth daily 90 tablet 3         ALLERGIES:  No Known Allergies      PAST MEDICAL HISTORY:  Past Medical History:   Diagnosis Date     Ovarian cyst          PAST SURGICAL HISTORY:  Past Surgical History:   Procedure Laterality Date     ovarian cystectomy  09/21/2018         SOCIAL HISTORY:  Social History     Socioeconomic History     Marital status:      Spouse name: Not on file     Number of children: Not on file     Years of education: Not on file     Highest education level: Not on file   Occupational History     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, factory   Tobacco Use     Smoking status: Never Smoker     Smokeless tobacco: Never Used   Vaping Use     Vaping Use: Never used   Substance and Sexual Activity     Alcohol use: Not Currently     Drug use: Never     Sexual activity: Yes     Partners: Male   Other Topics Concern     Not on file   Social History Narrative     Not on file     Social Determinants of Health     Financial Resource Strain: Not on file   Food Insecurity: Not on file   Transportation Needs: Not on file   Physical Activity: Not on file   Stress: Not on file   Social Connections: Not on file   Intimate Partner Violence: Not At Risk     Fear of Current or Ex-Partner: No     Emotionally Abused: No     Physically Abused: No     Sexually Abused: No   Housing Stability: Not on file         FAMILY HISTORY:  Family History   Problem Relation Age of Onset     Thyroid Disease Mother       Pacemaker Mother      Pacemaker Father          PHYSICAL EXAM:  Vital signs:  /72 (Cuff Size: Adult Regular)   Pulse 72   Temp 97.3  F (36.3  C) (Tympanic)   Resp 16   Wt 79.8 kg (176 lb)   LMP 10/17/2021   SpO2 98%   BMI 33.53 kg/m     GENERAL/CONSTITUTIONAL: No acute distress.  EYES: Pupils are equal, round, and react to light and accommodation. Extraocular movements intact.  No scleral icterus.  ENT/MOUTH: Neck supple. Oropharynx clear, no mucositis.  LYMPH: No anterior cervical, posterior cervical, supraclavicular, axillary or inguinal adenopathy.   RESPIRATORY: Clear to auscultation bilaterally. No crackles or wheezing.   CARDIOVASCULAR: Regular rate and rhythm without murmurs, gallops, or rubs.  GASTROINTESTINAL: No hepatosplenomegaly, masses, or tenderness. The patient has normal bowel sounds. No guarding.  No distention. Gravid abdomen.  MUSCULOSKELETAL: Warm and well-perfused, no cyanosis, clubbing, or edema.  NEUROLOGIC: Cranial nerves II-XII are intact. Alert, oriented, answers questions appropriately.  INTEGUMENTARY: No rashes or jaundice.  GAIT: Steady, does not use assistive device      LABS:  CBC RESULTS:   Recent Labs   Lab Test 22  1443   WBC 7.5   RBC 4.09   HGB 12.2   HCT 36.4   MCV 89   MCH 29.8   MCHC 33.5   RDW 13.5   *       Recent Labs   Lab Test 22  1443 22  1543    138   POTASSIUM 4.0 3.9   CHLORIDE 109 107   CO2 23 21   ANIONGAP 7 10   GLC 90 99   BUN 11 13   CR 0.35* 0.47*   DENITA 9.2 8.8         PATHOLOGY:  Final Diagnosis 3/28/22:   A.  Peripheral blood smear for morphology:  - Isolated, borderline thrombocytopenia.            IMAGING:  N/A     ASSESSMENT/PLAN:  Allison Henao is a  42 year old female who is referred for anemia and thrombocytopenia in pregnancy. She is currently 32 weeks gestation. Past medical history is significant for ovarian cyst, prediabetes, HLD. She is Palauan-speaking and history is obtained via virtual  . Induction is planned for 7/18/22. Her actual due date is 7/24/22.     Discussed with patient her past medical history, previous pregnancies, and current laboratory findings. She has evidence of iron deficiency anemia due to pregnancy and will supplement with venofer 300 mg weekly x3 weeks. She should continue daily iron, VitC, and prenatal vitamin.      For thrombocytopenia, differential includes gestational thrombocytopenia as platelets continue to be >100K. However, we discussed the possibility of ITP and need to monitor for HELLP/DIC. We discussed the possibility of steroid pulse or IVIG in the future.      Repeat CBC returned with PLT of 147K. WBC and Hb WNL. Renal function and LFTs WNL. LDH and haptoglobin WNL. Folate 36.1. B12 borderline at 227 and started on supplementation. Coag studies WNL. Peripheral smear with isolated, borderline thrombocytopenia. Otherwise, no other abnormalities.     She is seeing OB/GYN weekly. She will see Dr. Soliz at the end of the month.      -Patient's thrombocytopenia is most likely due to gestational thrombocytopenia (physiologic).   -Continue once daily ferrous sulfate, VitC, B12 supplementation.  -s/p venofer x3 5/9-5/23/22.  -Patient is due for induction on 7/18/22.  -Repeat labs next week and will contact patient if platelets <100K.  -Repeat CBC in 1 month with clinic follow up.      Thank you referring Mrs. Henao to clinic. Adequate time was dedicated to patient questions and answered to the expressed satisfaction of the patient.           Zari Roque DO  Hematology/Oncology  [facility]            Again, thank you for allowing me to participate in the care of your patient.        Sincerely,        Zari Roque DO

## 2022-07-07 ENCOUNTER — PRENATAL OFFICE VISIT (OUTPATIENT)
Dept: OBGYN | Facility: CLINIC | Age: 42
End: 2022-07-07
Payer: COMMERCIAL

## 2022-07-07 VITALS — BODY MASS INDEX: 33.64 KG/M2 | DIASTOLIC BLOOD PRESSURE: 62 MMHG | SYSTOLIC BLOOD PRESSURE: 118 MMHG | WEIGHT: 176.6 LBS

## 2022-07-07 DIAGNOSIS — O99.119 THROMBOCYTOPENIA AFFECTING PREGNANCY (H): ICD-10-CM

## 2022-07-07 DIAGNOSIS — O09.529 SUPERVISION OF HIGH-RISK PREGNANCY OF ELDERLY MULTIGRAVIDA: Primary | ICD-10-CM

## 2022-07-07 DIAGNOSIS — D69.6 THROMBOCYTOPENIA AFFECTING PREGNANCY (H): ICD-10-CM

## 2022-07-07 LAB — HAPTOGLOB SERPL-MCNC: 127 MG/DL (ref 32–197)

## 2022-07-07 PROCEDURE — 99207 PR PRENATAL VISIT: CPT | Performed by: OBSTETRICS & GYNECOLOGY

## 2022-07-07 NOTE — PROGRESS NOTES
Routine obstetric visit at 37w4d   Estimated Date of Delivery: Jul 24, 2022   Visit conducted with .    Issues:   1. PNC:   2. AMA: level II within normal limits other than circumvallate placenta  3. Thrombocytopenia: s/p Heme consult, ITP vs gest thrombocytopenia. Improved from plt 106->113.   4. Circumvallate placenta:   5. Positive LEEANN titer 1:160. Discussed with MFM, keep growth US as scheduled, no other follow up needed.  6. Mild anemia, on iron.  7. AMA: plan 39w IOL. Millan score of 7 today. Plan 39w IOL, morning induction with AROM/Pit. Discussed stripping membranes next visit  8. PP: considering tubal ligation, likely not immediate. Has used natural family planning in the past.     Return to clinic weekly through delivery. Reviewed labor warning signs.     Natalie Soliz MD

## 2022-07-07 NOTE — NURSING NOTE
"Chief Complaint   Patient presents with     Prenatal Care     37 weeks 4 days, no c/o VB, LoF. Some minor cramping/contractions. Feeling FM daily. Patient would like cervix checked today. No questions or concerns.       Initial /62   Wt 80.1 kg (176 lb 9.6 oz)   LMP 10/17/2021   BMI 33.64 kg/m   Estimated body mass index is 33.64 kg/m  as calculated from the following:    Height as of 22: 1.543 m (5' 0.75\").    Weight as of this encounter: 80.1 kg (176 lb 9.6 oz).  BP completed using cuff size: regular    Questioned patient about current smoking habits.  Pt. has never smoked.          The following HM Due: NONE    Janelle Ramsey CMA               "

## 2022-07-10 ENCOUNTER — HOSPITAL ENCOUNTER (INPATIENT)
Facility: CLINIC | Age: 42
LOS: 2 days | Discharge: HOME | End: 2022-07-12
Attending: OBSTETRICS & GYNECOLOGY | Admitting: OBSTETRICS & GYNECOLOGY
Payer: COMMERCIAL

## 2022-07-10 ENCOUNTER — NURSE TRIAGE (OUTPATIENT)
Dept: NURSING | Facility: CLINIC | Age: 42
End: 2022-07-10

## 2022-07-10 PROBLEM — D50.0 IRON DEFICIENCY ANEMIA DUE TO CHRONIC BLOOD LOSS: Status: RESOLVED | Noted: 2022-03-28 | Resolved: 2022-07-10

## 2022-07-10 PROBLEM — O09.529 ANTEPARTUM MULTIGRAVIDA OF ADVANCED MATERNAL AGE: Status: RESOLVED | Noted: 2022-05-25 | Resolved: 2022-07-10

## 2022-07-10 PROBLEM — O09.899 SUPERVISION OF OTHER HIGH RISK PREGNANCY, ANTEPARTUM: Status: RESOLVED | Noted: 2022-05-25 | Resolved: 2022-07-10

## 2022-07-10 PROBLEM — O43.119 ANTEPARTUM PLACENTA CIRCUMVALLATA: Status: RESOLVED | Noted: 2022-05-25 | Resolved: 2022-07-10

## 2022-07-10 PROBLEM — Z36.89 ENCOUNTER FOR TRIAGE IN PREGNANT PATIENT: Status: RESOLVED | Noted: 2022-07-10 | Resolved: 2022-07-10

## 2022-07-10 PROBLEM — Z36.89 ENCOUNTER FOR TRIAGE IN PREGNANT PATIENT: Status: ACTIVE | Noted: 2022-07-10

## 2022-07-10 LAB
ABO/RH(D): NORMAL
ANTIBODY SCREEN: NEGATIVE
CRYSTALS AMN MICRO: NORMAL
ERYTHROCYTE [DISTWIDTH] IN BLOOD BY AUTOMATED COUNT: 13.8 % (ref 10–15)
HCT VFR BLD AUTO: 36.6 % (ref 35–47)
HGB BLD-MCNC: 11.9 G/DL (ref 11.7–15.7)
MCH RBC QN AUTO: 29.9 PG (ref 26.5–33)
MCHC RBC AUTO-ENTMCNC: 32.5 G/DL (ref 31.5–36.5)
MCV RBC AUTO: 92 FL (ref 78–100)
PLATELET # BLD AUTO: 111 10E3/UL (ref 150–450)
RBC # BLD AUTO: 3.98 10E6/UL (ref 3.8–5.2)
RUPTURE OF FETAL MEMBRANES BY ROM PLUS: POSITIVE
SARS-COV-2 RNA RESP QL NAA+PROBE: NEGATIVE
SPECIMEN EXPIRATION DATE: NORMAL
T PALLIDUM AB SER QL: NONREACTIVE
WBC # BLD AUTO: 7.4 10E3/UL (ref 4–11)

## 2022-07-10 PROCEDURE — 85027 COMPLETE CBC AUTOMATED: CPT | Performed by: OBSTETRICS & GYNECOLOGY

## 2022-07-10 PROCEDURE — 250N000009 HC RX 250: Performed by: OBSTETRICS & GYNECOLOGY

## 2022-07-10 PROCEDURE — 86780 TREPONEMA PALLIDUM: CPT | Performed by: OBSTETRICS & GYNECOLOGY

## 2022-07-10 PROCEDURE — 84112 EVAL AMNIOTIC FLUID PROTEIN: CPT | Performed by: OBSTETRICS & GYNECOLOGY

## 2022-07-10 PROCEDURE — G0463 HOSPITAL OUTPT CLINIC VISIT: HCPCS

## 2022-07-10 PROCEDURE — 722N000001 HC LABOR CARE VAGINAL DELIVERY SINGLE

## 2022-07-10 PROCEDURE — 59400 OBSTETRICAL CARE: CPT | Performed by: OBSTETRICS & GYNECOLOGY

## 2022-07-10 PROCEDURE — 250N000013 HC RX MED GY IP 250 OP 250 PS 637: Performed by: OBSTETRICS & GYNECOLOGY

## 2022-07-10 PROCEDURE — 120N000001 HC R&B MED SURG/OB

## 2022-07-10 PROCEDURE — U0003 INFECTIOUS AGENT DETECTION BY NUCLEIC ACID (DNA OR RNA); SEVERE ACUTE RESPIRATORY SYNDROME CORONAVIRUS 2 (SARS-COV-2) (CORONAVIRUS DISEASE [COVID-19]), AMPLIFIED PROBE TECHNIQUE, MAKING USE OF HIGH THROUGHPUT TECHNOLOGIES AS DESCRIBED BY CMS-2020-01-R: HCPCS | Performed by: OBSTETRICS & GYNECOLOGY

## 2022-07-10 PROCEDURE — 258N000003 HC RX IP 258 OP 636: Performed by: OBSTETRICS & GYNECOLOGY

## 2022-07-10 PROCEDURE — 86850 RBC ANTIBODY SCREEN: CPT | Performed by: OBSTETRICS & GYNECOLOGY

## 2022-07-10 RX ORDER — NALOXONE HYDROCHLORIDE 0.4 MG/ML
0.4 INJECTION, SOLUTION INTRAMUSCULAR; INTRAVENOUS; SUBCUTANEOUS
Status: DISCONTINUED | OUTPATIENT
Start: 2022-07-10 | End: 2022-07-10 | Stop reason: HOSPADM

## 2022-07-10 RX ORDER — NALOXONE HYDROCHLORIDE 0.4 MG/ML
0.2 INJECTION, SOLUTION INTRAMUSCULAR; INTRAVENOUS; SUBCUTANEOUS
Status: DISCONTINUED | OUTPATIENT
Start: 2022-07-10 | End: 2022-07-10 | Stop reason: HOSPADM

## 2022-07-10 RX ORDER — DOCUSATE SODIUM 100 MG/1
100 CAPSULE, LIQUID FILLED ORAL DAILY
Status: DISCONTINUED | OUTPATIENT
Start: 2022-07-10 | End: 2022-07-12 | Stop reason: HOSPADM

## 2022-07-10 RX ORDER — METOCLOPRAMIDE HYDROCHLORIDE 5 MG/ML
10 INJECTION INTRAMUSCULAR; INTRAVENOUS EVERY 6 HOURS PRN
Status: DISCONTINUED | OUTPATIENT
Start: 2022-07-10 | End: 2022-07-10 | Stop reason: HOSPADM

## 2022-07-10 RX ORDER — LIDOCAINE 40 MG/G
CREAM TOPICAL
Status: DISCONTINUED | OUTPATIENT
Start: 2022-07-10 | End: 2022-07-10 | Stop reason: HOSPADM

## 2022-07-10 RX ORDER — METHYLERGONOVINE MALEATE 0.2 MG/ML
200 INJECTION INTRAVENOUS
Status: DISCONTINUED | OUTPATIENT
Start: 2022-07-10 | End: 2022-07-12 | Stop reason: HOSPADM

## 2022-07-10 RX ORDER — EPHEDRINE SULFATE 50 MG/ML
5 INJECTION, SOLUTION INTRAMUSCULAR; INTRAVENOUS; SUBCUTANEOUS
Status: DISCONTINUED | OUTPATIENT
Start: 2022-07-10 | End: 2022-07-10 | Stop reason: HOSPADM

## 2022-07-10 RX ORDER — MISOPROSTOL 200 UG/1
400 TABLET ORAL
Status: DISCONTINUED | OUTPATIENT
Start: 2022-07-10 | End: 2022-07-10 | Stop reason: HOSPADM

## 2022-07-10 RX ORDER — MISOPROSTOL 200 UG/1
400 TABLET ORAL
Status: DISCONTINUED | OUTPATIENT
Start: 2022-07-10 | End: 2022-07-12 | Stop reason: HOSPADM

## 2022-07-10 RX ORDER — BISACODYL 10 MG
10 SUPPOSITORY, RECTAL RECTAL DAILY PRN
Status: DISCONTINUED | OUTPATIENT
Start: 2022-07-10 | End: 2022-07-12 | Stop reason: HOSPADM

## 2022-07-10 RX ORDER — MISOPROSTOL 200 UG/1
800 TABLET ORAL
Status: DISCONTINUED | OUTPATIENT
Start: 2022-07-10 | End: 2022-07-12 | Stop reason: HOSPADM

## 2022-07-10 RX ORDER — SCOLOPAMINE TRANSDERMAL SYSTEM 1 MG/1
1 PATCH, EXTENDED RELEASE TRANSDERMAL ONCE
Status: DISCONTINUED | OUTPATIENT
Start: 2022-07-10 | End: 2022-07-10 | Stop reason: HOSPADM

## 2022-07-10 RX ORDER — ONDANSETRON 4 MG/1
4 TABLET, ORALLY DISINTEGRATING ORAL EVERY 6 HOURS PRN
Status: DISCONTINUED | OUTPATIENT
Start: 2022-07-10 | End: 2022-07-10 | Stop reason: HOSPADM

## 2022-07-10 RX ORDER — NALBUPHINE HYDROCHLORIDE 10 MG/ML
2.5-5 INJECTION, SOLUTION INTRAMUSCULAR; INTRAVENOUS; SUBCUTANEOUS EVERY 6 HOURS PRN
Status: DISCONTINUED | OUTPATIENT
Start: 2022-07-10 | End: 2022-07-10

## 2022-07-10 RX ORDER — KETOROLAC TROMETHAMINE 30 MG/ML
30 INJECTION, SOLUTION INTRAMUSCULAR; INTRAVENOUS
Status: DISCONTINUED | OUTPATIENT
Start: 2022-07-10 | End: 2022-07-10

## 2022-07-10 RX ORDER — ACETAMINOPHEN 325 MG/1
650 TABLET ORAL EVERY 4 HOURS PRN
Status: DISCONTINUED | OUTPATIENT
Start: 2022-07-10 | End: 2022-07-10 | Stop reason: HOSPADM

## 2022-07-10 RX ORDER — FENTANYL CITRATE 50 UG/ML
100 INJECTION, SOLUTION INTRAMUSCULAR; INTRAVENOUS
Status: DISCONTINUED | OUTPATIENT
Start: 2022-07-10 | End: 2022-07-10 | Stop reason: HOSPADM

## 2022-07-10 RX ORDER — CITRIC ACID/SODIUM CITRATE 334-500MG
30 SOLUTION, ORAL ORAL ONCE
Status: DISCONTINUED | OUTPATIENT
Start: 2022-07-10 | End: 2022-07-10 | Stop reason: HOSPADM

## 2022-07-10 RX ORDER — OXYTOCIN 10 [USP'U]/ML
10 INJECTION, SOLUTION INTRAMUSCULAR; INTRAVENOUS
Status: DISCONTINUED | OUTPATIENT
Start: 2022-07-10 | End: 2022-07-10 | Stop reason: HOSPADM

## 2022-07-10 RX ORDER — HYDROCORTISONE 25 MG/G
CREAM TOPICAL 3 TIMES DAILY PRN
Status: DISCONTINUED | OUTPATIENT
Start: 2022-07-10 | End: 2022-07-12 | Stop reason: HOSPADM

## 2022-07-10 RX ORDER — OXYTOCIN/0.9 % SODIUM CHLORIDE 30/500 ML
340 PLASTIC BAG, INJECTION (ML) INTRAVENOUS CONTINUOUS PRN
Status: DISCONTINUED | OUTPATIENT
Start: 2022-07-10 | End: 2022-07-10 | Stop reason: HOSPADM

## 2022-07-10 RX ORDER — OXYTOCIN 10 [USP'U]/ML
10 INJECTION, SOLUTION INTRAMUSCULAR; INTRAVENOUS
Status: DISCONTINUED | OUTPATIENT
Start: 2022-07-10 | End: 2022-07-10

## 2022-07-10 RX ORDER — PROCHLORPERAZINE MALEATE 10 MG
10 TABLET ORAL EVERY 6 HOURS PRN
Status: DISCONTINUED | OUTPATIENT
Start: 2022-07-10 | End: 2022-07-10 | Stop reason: HOSPADM

## 2022-07-10 RX ORDER — ONDANSETRON 2 MG/ML
4 INJECTION INTRAMUSCULAR; INTRAVENOUS EVERY 6 HOURS PRN
Status: DISCONTINUED | OUTPATIENT
Start: 2022-07-10 | End: 2022-07-10 | Stop reason: HOSPADM

## 2022-07-10 RX ORDER — METOCLOPRAMIDE 10 MG/1
10 TABLET ORAL EVERY 6 HOURS PRN
Status: DISCONTINUED | OUTPATIENT
Start: 2022-07-10 | End: 2022-07-10 | Stop reason: HOSPADM

## 2022-07-10 RX ORDER — OXYTOCIN/0.9 % SODIUM CHLORIDE 30/500 ML
100-340 PLASTIC BAG, INJECTION (ML) INTRAVENOUS CONTINUOUS PRN
Status: DISCONTINUED | OUTPATIENT
Start: 2022-07-10 | End: 2022-07-10

## 2022-07-10 RX ORDER — METHYLERGONOVINE MALEATE 0.2 MG/ML
200 INJECTION INTRAVENOUS
Status: DISCONTINUED | OUTPATIENT
Start: 2022-07-10 | End: 2022-07-10 | Stop reason: HOSPADM

## 2022-07-10 RX ORDER — CITRIC ACID/SODIUM CITRATE 334-500MG
30 SOLUTION, ORAL ORAL
Status: DISCONTINUED | OUTPATIENT
Start: 2022-07-10 | End: 2022-07-10 | Stop reason: HOSPADM

## 2022-07-10 RX ORDER — OXYTOCIN 10 [USP'U]/ML
10 INJECTION, SOLUTION INTRAMUSCULAR; INTRAVENOUS
Status: DISCONTINUED | OUTPATIENT
Start: 2022-07-10 | End: 2022-07-12 | Stop reason: HOSPADM

## 2022-07-10 RX ORDER — MODIFIED LANOLIN
OINTMENT (GRAM) TOPICAL
Status: DISCONTINUED | OUTPATIENT
Start: 2022-07-10 | End: 2022-07-12 | Stop reason: HOSPADM

## 2022-07-10 RX ORDER — IBUPROFEN 800 MG/1
800 TABLET, FILM COATED ORAL EVERY 6 HOURS PRN
Status: DISCONTINUED | OUTPATIENT
Start: 2022-07-10 | End: 2022-07-12 | Stop reason: HOSPADM

## 2022-07-10 RX ORDER — TRANEXAMIC ACID 10 MG/ML
1 INJECTION, SOLUTION INTRAVENOUS EVERY 30 MIN PRN
Status: DISCONTINUED | OUTPATIENT
Start: 2022-07-10 | End: 2022-07-10 | Stop reason: HOSPADM

## 2022-07-10 RX ORDER — ONDANSETRON 4 MG/1
4 TABLET, ORALLY DISINTEGRATING ORAL EVERY 6 HOURS PRN
Status: DISCONTINUED | OUTPATIENT
Start: 2022-07-10 | End: 2022-07-10

## 2022-07-10 RX ORDER — CARBOPROST TROMETHAMINE 250 UG/ML
250 INJECTION, SOLUTION INTRAMUSCULAR
Status: DISCONTINUED | OUTPATIENT
Start: 2022-07-10 | End: 2022-07-12 | Stop reason: HOSPADM

## 2022-07-10 RX ORDER — PROCHLORPERAZINE 25 MG
25 SUPPOSITORY, RECTAL RECTAL EVERY 12 HOURS PRN
Status: DISCONTINUED | OUTPATIENT
Start: 2022-07-10 | End: 2022-07-10 | Stop reason: HOSPADM

## 2022-07-10 RX ORDER — TERBUTALINE SULFATE 1 MG/ML
0.25 INJECTION, SOLUTION SUBCUTANEOUS
Status: DISCONTINUED | OUTPATIENT
Start: 2022-07-10 | End: 2022-07-10 | Stop reason: HOSPADM

## 2022-07-10 RX ORDER — CARBOPROST TROMETHAMINE 250 UG/ML
250 INJECTION, SOLUTION INTRAMUSCULAR
Status: DISCONTINUED | OUTPATIENT
Start: 2022-07-10 | End: 2022-07-10 | Stop reason: HOSPADM

## 2022-07-10 RX ORDER — SODIUM CHLORIDE, SODIUM LACTATE, POTASSIUM CHLORIDE, CALCIUM CHLORIDE 600; 310; 30; 20 MG/100ML; MG/100ML; MG/100ML; MG/100ML
INJECTION, SOLUTION INTRAVENOUS CONTINUOUS
Status: DISCONTINUED | OUTPATIENT
Start: 2022-07-10 | End: 2022-07-10 | Stop reason: HOSPADM

## 2022-07-10 RX ORDER — SODIUM CHLORIDE, SODIUM LACTATE, POTASSIUM CHLORIDE, CALCIUM CHLORIDE 600; 310; 30; 20 MG/100ML; MG/100ML; MG/100ML; MG/100ML
INJECTION, SOLUTION INTRAVENOUS CONTINUOUS PRN
Status: DISCONTINUED | OUTPATIENT
Start: 2022-07-10 | End: 2022-07-10 | Stop reason: HOSPADM

## 2022-07-10 RX ORDER — ACETAMINOPHEN 325 MG/1
650 TABLET ORAL EVERY 4 HOURS PRN
Status: DISCONTINUED | OUTPATIENT
Start: 2022-07-10 | End: 2022-07-12 | Stop reason: HOSPADM

## 2022-07-10 RX ORDER — OXYTOCIN/0.9 % SODIUM CHLORIDE 30/500 ML
1-24 PLASTIC BAG, INJECTION (ML) INTRAVENOUS CONTINUOUS
Status: DISCONTINUED | OUTPATIENT
Start: 2022-07-10 | End: 2022-07-10 | Stop reason: HOSPADM

## 2022-07-10 RX ORDER — OXYTOCIN/0.9 % SODIUM CHLORIDE 30/500 ML
340 PLASTIC BAG, INJECTION (ML) INTRAVENOUS CONTINUOUS PRN
Status: DISCONTINUED | OUTPATIENT
Start: 2022-07-10 | End: 2022-07-12 | Stop reason: HOSPADM

## 2022-07-10 RX ORDER — IBUPROFEN 800 MG/1
800 TABLET, FILM COATED ORAL
Status: DISCONTINUED | OUTPATIENT
Start: 2022-07-10 | End: 2022-07-10

## 2022-07-10 RX ORDER — ONDANSETRON 2 MG/ML
4 INJECTION INTRAMUSCULAR; INTRAVENOUS EVERY 6 HOURS PRN
Status: DISCONTINUED | OUTPATIENT
Start: 2022-07-10 | End: 2022-07-10

## 2022-07-10 RX ORDER — TRANEXAMIC ACID 10 MG/ML
1 INJECTION, SOLUTION INTRAVENOUS EVERY 30 MIN PRN
Status: DISCONTINUED | OUTPATIENT
Start: 2022-07-10 | End: 2022-07-12 | Stop reason: HOSPADM

## 2022-07-10 RX ORDER — MISOPROSTOL 200 UG/1
800 TABLET ORAL
Status: DISCONTINUED | OUTPATIENT
Start: 2022-07-10 | End: 2022-07-10 | Stop reason: HOSPADM

## 2022-07-10 RX ADMIN — IBUPROFEN 800 MG: 800 TABLET, FILM COATED ORAL at 22:31

## 2022-07-10 RX ADMIN — Medication 2 MILLI-UNITS/MIN: at 09:58

## 2022-07-10 RX ADMIN — SODIUM CHLORIDE, POTASSIUM CHLORIDE, SODIUM LACTATE AND CALCIUM CHLORIDE 500 ML: 600; 310; 30; 20 INJECTION, SOLUTION INTRAVENOUS at 08:56

## 2022-07-10 RX ADMIN — SODIUM CHLORIDE, POTASSIUM CHLORIDE, SODIUM LACTATE AND CALCIUM CHLORIDE: 600; 310; 30; 20 INJECTION, SOLUTION INTRAVENOUS at 13:58

## 2022-07-10 ASSESSMENT — ACTIVITIES OF DAILY LIVING (ADL)
ADLS_ACUITY_SCORE: 18
CHANGE_IN_FUNCTIONAL_STATUS_SINCE_ONSET_OF_CURRENT_ILLNESS/INJURY: NO
ADLS_ACUITY_SCORE: 18

## 2022-07-10 NOTE — PLAN OF CARE
Data: [facility] presented to [facility]: 7/10/2022  3:59 AM.  Reason for maternal/fetal assessment is leaking vaginal fluid. Patient reports she started leaking clear fluid around 0200 this morning.  Patient is a .  Prenatal record reviewed. Pregnancy  has been complicated by circumvalate placenta, thrombocytopenia, AMA, and mild anemia.  Gestational Age 38w0d. VSS. Fetal movement active. Patient denies uterine contractions, vaginal bleeding, abdominal pain, pelvic pressure, nausea, vomiting, headache, visual disturbances, epigastric or URQ pain, significant edema. Support person is present.   Action: Verbal consent for EFM. Triage assessment completed. Bill of rights reviewed.  Response: Patient verbalized agreement with plan. Will contact Dr Milad Harvey with update and for further orders.

## 2022-07-10 NOTE — PROVIDER NOTIFICATION
07/10/22 0505   Provider Notification   Provider Name/Title Dr. Harvey   Method of Notification Phone   Request Evaluate - Remote   Notification Reason Lab/Diagnostic Study     MD updated on Fern results: no ferning present, but chux is visibly wet underneath the patient.  MD would like a ROM+ sent, and we can continue to observe her.  T category 1.

## 2022-07-10 NOTE — TELEPHONE ENCOUNTER
"  OB Triage Call      Is patient's OB/Midwife with the [facility] or [facility]? [facility]- Proceed with triage     Reason for call: Leaking of fluid from vagina    Assessment: Almost 2 hours ago, she felt like something was leaking from her vagina. Now is continuous leaking clear fluid and some mucus. Abdomen is feeling hard.     Plan: Son will drive patient to [facility] L&D.    Patient plans to deliver at [facility]     Patient's primary OB Provider is Natalie Soliz.      Per protocol recommendations Patient to be evaluated in L&D. Patient's primary OB is [facility].  Labor and delivery at [facility] ([phone]) notified of patient's pending arrival.  Report given to Zehra.    Is patient's delivering hospital on divert? No      38w0d    Estimated Date of Delivery: 2022        OB History    Para Term  AB Living   4 2 2 0 1 2   SAB IAB Ectopic Multiple Live Births   1 0 0 0 2      # Outcome Date GA Lbr John/2nd Weight Sex Delivery Anes PTL Lv   4 Current            3 2017           2 Term  38w0d   M Vag-Spont   ROMINA      Name: Rajesh   1 Term  40w0d   F Vag-Spont   ROMINA      Name: Yessy       No results found for: GBS       Lennie Maurer RN 07/10/22 3:19 AM  [facility] Nurse Advisor    Reason for Disposition    Leakage of fluid from vagina    Additional Information    Negative: [1] SEVERE abdominal pain (e.g., excruciating) AND [2] constant AND [3] present > 1 hour    Negative: Severe bleeding (e.g., continuous red blood from vagina, or large blood clots)    Negative: Umbilical cord hanging out of the vagina (shiny, white, curled appearance, \"like telephone cord\")    Negative: Can see baby    Negative: Uncontrollable urge to push (i.e., feels like baby is coming out now)    Negative: Vaginal bleeding    Negative: < 20 weeks pregnant    Protocols used: PREGNANCY - RUPTURE OF [id number]      "

## 2022-07-10 NOTE — PLAN OF CARE
Data: Allison Henao transferred to 425 via wheelchair at 1745. Baby transferred via parent's arms.  Action: Receiving unit notified of transfer: Yes. Patient and family notified of room change. Report given to CHELSIE Brody at 1745. Belongings sent to receiving unit. Accompanied by Registered Nurse. Oriented patient to surroundings. Call light within reach. ID bands double-checked with receiving RN.  Response: Patient tolerated transfer and is stable.

## 2022-07-10 NOTE — PROGRESS NOTES
"[facility] L&D Labor Note    Allison Henao MRN# [id number]   Age: 42 year old YOB: 1980           Subjective:     I communicated with Pt via her  who acts as  because Pt speaks no/limited English.    Pt feels no pain w/ UCs.            Objective:   Patient Vitals for the past 8 hrs:   BP Temp Temp src Resp Height Weight   07/10/22 1100 119/68 98.3  F (36.8  C) Oral -- -- --   07/10/22 0715 116/60 98.8  F (37.1  C) Oral -- -- --   07/10/22 0410 117/67 97.8  F (36.6  C) Oral 18 1.549 m (5' 1\") 79.8 kg (176 lb)        Cervical Exam: 3/50%/-3/Vtx per RN exam at 4:30 AM.    Fetal Heart Rate: Cat 1    [place]: UCs mild, Q7-8 min    Labs:  Results for orders placed or performed during the hospital encounter of 07/10/22   Fern Test for Rupture of Membranes     Status: Normal   Result Value Ref Range    Fern Crystallization No ferning present No ferning present   Rupture of Fetal Membranes by ROM Plus     Status: Abnormal   Result Value Ref Range    Rupture of Fetal Membranes by ROM Plus Positive (A) Negative, Invalid, Suggest Repeat    Narrative    It is recommended that the tests to detect rupture of the amniotic membranes should not be used without other clinical assessments to make clinical patient management decision.   Asymptomatic COVID-19 Virus (Coronavirus) by PCR Nasopharyngeal     Status: Normal    Specimen: Nasopharyngeal; Swab   Result Value Ref Range    SARS CoV2 PCR Negative Negative    Narrative    Testing was performed using the Xpert Xpress SARS-CoV-2 Assay on the   Cepheid Gene-Xpert Instrument Systems. Additional information about   this Emergency Use Authorization (EUA) assay can be found via the Lab   Guide. This test should be ordered for the detection of SARS-CoV-2 in   individuals who meet SARS-CoV-2 clinical and/or epidemiological   criteria. Test performance is unknown in asymptomatic patients. This   test is for in vitro diagnostic use under the FDA EUA for "   laboratories certified under CLIA to perform high complexity testing.   This test has not been FDA cleared or approved. A negative result   does not rule out the presence of PCR inhibitors in the specimen or   target RNA in concentration below the limit of detection for the   assay. The possibility of a false negative should be considered if   the patient's recent exposure or clinical presentation suggests   COVID-19. This test was validated by the [facility]. This laboratory is certified under the Clinical Laboratory Improvement Amendments of 1988 (CLIA-88) as qualified to perform high complexity laboratory testing.     CBC with platelets     Status: Abnormal   Result Value Ref Range    WBC Count 7.4 4.0 - 11.0 10e3/uL    RBC Count 3.98 3.80 - 5.20 10e6/uL    Hemoglobin 11.9 11.7 - 15.7 g/dL    Hematocrit 36.6 35.0 - 47.0 %    MCV 92 78 - 100 fL    MCH 29.9 26.5 - 33.0 pg    MCHC 32.5 31.5 - 36.5 g/dL    RDW 13.8 10.0 - 15.0 %    Platelet Count 111 (L) 150 - 450 10e3/uL   Adult Type and Screen     Status: None   Result Value Ref Range    ABO/RH(D) O POS     Antibody Screen Negative Negative    SPECIMEN EXPIRATION DATE [id number]              Assessment:   1)  IUP at 38w0d    2)  PROM - not yet in labor    3)  Favorable Cx    4)  GBS Neg    5) Gest thrombocytopenia - Plts stable          Plan:   1)  On Pitocin currently to augment labor    2)  Epidural prn    3)  Antic       Dany Valenzuela MD

## 2022-07-10 NOTE — PROVIDER NOTIFICATION
07/10/22 0755   Provider Notification   Provider Name/Title Dr. Valenzuela   Method of Notification In Department   Request Evaluate - Remote   Notification Reason Status Update     MD in department and updated on patient. She is still seun occasionally, but she is feeling them now.  T category 1.  MD was going to go to post-partum and be back, but intrapartum orders and pitocin orders received.  MD to talk with patient when he gets back.

## 2022-07-10 NOTE — PROVIDER NOTIFICATION
07/10/22 0437   Provider Notification   Provider Name/Title Dr. Harvey   Method of Notification Phone   Request Evaluate - Remote   Notification Reason Patient Arrived     Dr Milad Harvey informed of patient arrival and assessment including the following:    Patient 38 weeks and is a  with two previous vaginal deliveries.  Pregnancy has been complicated by a circumvalate placenta, mild anemia, and thrombocytopenia.  Reason for maternal/fetal assessment leaking vaginal fluid. Pt reports she thinks that her water broke around 0200 and she's been leaking clear fluid since then.  She denies any bleeding, or contractions and is still feeling baby move. Fetal status normal baseline, moderate variability, accelerations present and a variable decel. Fern and ROM+ both collected and SVE: 3 cm, 50%, -3 station.  Plan per provider/orders send the Fern, not the ROM+.  Will update MD with results.

## 2022-07-10 NOTE — PROVIDER NOTIFICATION
07/10/22 1405   Provider Notification   Provider Name/Title Dr. Valenzuela   Method of Notification Phone   Request Evaluate - Remote   Notification Reason Decels     MD updated on FHT and contraction pattern.  SVE: unchanged.  MD asked to review the strip.  Will review and call back to the unit.

## 2022-07-10 NOTE — PROVIDER NOTIFICATION
07/10/22 1115   Provider Notification   Provider Name/Title Dr. Valenzuela   Method of Notification At Bedside   Request Evaluate in Person   Notification Reason Status Update     MD at the bedside discussing plan of care with the patient.  MD would like to continue pitocin.  Okay for patient to get an epidural whenever she would like.  Patient not feeling contractions and resting comfortably.  She declined an exercise ball and a rocking chair at this time.

## 2022-07-10 NOTE — L&D DELIVERY NOTE
OB Vaginal Delivery Note    Allison Henao MRN# [id number]   Age: 42 year old YOB: 1980       GA: 38w0d  GP:   Labor Complications: None   EBL:   mL  Delivery QBL:    Delivery Type: Vaginal, Spontaneous   ROM to Delivery Time: (Delivered) Hours: 13 Minutes: 7   Weight:     1 Minute 5 Minute 10 Minute   Apgar Totals: 9   9             Delivery Details:  Allison Henao, a 42 year old  female delivered a viable infant with apgars of 9  and 9 . Patient was fully dilated and pushing after 3  hours 3  minutes in active labor. Delivery was via vaginal, spontaneous  to a sterile field under none  anesthesia. Infant delivered in vertex  left  occiput  anterior  position. Anterior and posterior shoulders delivered without difficulty. The cord was clamped, cut twice and 3 vessels  were noted. Cord blood was obtained in routine fashion with the following disposition: lab .      Cord complications: none   Placenta delivered at 7/10/2022  3:13 PM . Placental disposition was Hospital disposal . Fundal massage performed and fundus found to be firm.     Episiotomy: none    Perineum, vagina, cervix were inspected, and the following lacerations were noted:   Perineal lacerations: none                No lacerations were sustained.    Excellent hemostasis was noted. Needle count correct. Infant and patient in delivery room in good and stable condition.        Chapo Henao-Allison [[id number]]    Labor Event Times    Labor onset date: 7/10/22 Onset time: 12:00 PM   Dilation complete date: 7/10/22 Complete time:  3:03 PM   Start pushing date/time: 7/10/2022 1503      Labor Length    1st Stage (hrs): 3 (min): 3   2nd Stage (hrs): 0 (min): 4   3rd Stage (hrs): 0 (min): 6      Labor Events     labor?: No   steroids: None  Labor Type: Augmentation  Predominate monitoring during 1st stage: continuous electronic fetal monitoring     Antibiotics received during labor?: No     Rupture  "identifier: Sac 1  Rupture date/time: 7/10/22 0200   Rupture type: Spontaneous rupture of membranes occuring during spontaneous labor or augmentation  Fluid color: Clear  Fluid odor: Normal     Augmentation: Oxytocin  Indications for augmentation: Ineffective Contraction Pattern     Delivery/Placenta Date and Time    Delivery Date: 7/10/22 Delivery Time:  3:07 PM   Placenta Date/Time: 7/10/2022  3:13 PM  Oxytocin given at the time of delivery: after delivery of baby  Delivering clinician: Dany Valenzuela MD   Other personnel present at delivery:  Provider Role   Dany Valenzuela MD Wetschka, Maria, RN          Vaginal Counts     Initial count performed by 2 team members:  Two Team Members   Dr. Nicolas Szymanski RN       Needles Suture Needles Sponges (RETIRED) Instruments   Initial counts 2  5    Added to count       Relief counts       Final counts 2  5          Placed during labor Accounted for at the end of labor   FSE NA NA   IUPC NA NA   Cervidil NA NA              Final count performed by 2 team members:  Two Team Members   CHELSIE Szymanski Dr.      Final count correct?: Yes     Apgars    Living status: Living   1 Minute 5 Minute 10 Minute 15 Minute 20 Minute   Skin color: 1  1       Heart rate: 2  2       Reflex irritability: 2  2       Muscle tone: 2  2       Respiratory effort: 2  2       Total: 9  9       Apgars assigned by: CHELSIE SZYMANSKI     Cord    Vessels: 3 Vessels    Cord Complications: None               Cord Blood Disposition: Lab    Gases Sent?: No    Delayed cord clamping?: Yes    Cord Clamping Delay (seconds): 31-60 seconds    Stem cell collection?: No        Resuscitation    Output in Delivery Room: Voided     [place] Measurements    Weight: 7 lb 10.8 oz Length: 1' 8\"   Head circumference: 33.7 cm    Output in delivery room: Voided     Skin to Skin and Feeding Plan    Skin to skin initiation date/time: 1841    Skin to skin with: Mother  Skin to skin end date/time: 1841  "   Breastfeeding initiated date/time: 7/10/2022 1530     Labor Events and Shoulder Dystocia    Fetal Tracing Prior to Delivery: Category 2  Fetal Tracing Comments: Mild variable decelerations in active phase and second stage labor  Shoulder dystocia present?: Neg     Delivery (Maternal) (Provider to Complete) (223130)    Episiotomy: None  Perineal lacerations: None    Repair suture: None  Genital tract inspection done: Pos     Blood Loss  Mother: Allison Henao #[id number]   Start of Mother's Information    Delivery Blood Loss  07/10/22 1200 - 07/10/22 1704    None           End of Mother's Information  Mother: Allison Henao #[id number]          Delivery - Provider to Complete ([id number])    Delivering clinician: Dany Valenzuela MD  Attempted Delivery Types (Choose all that apply): Spontaneous Vaginal Delivery  Delivery Type (Choose the 1 that will go to the Birth History): Vaginal, Spontaneous                   Other personnel:  Provider Role   Dany Valenzuela MD Wetschka, Maria, RN                 Placenta    Date/Time: 7/10/2022  3:13 PM  Removal: Spontaneous  Disposition: Hospital disposal           Anesthesia    Method: None                Presentation and Position    Presentation: Vertex    Position: Left Occiput Anterior                 Dany Valenzuela MD

## 2022-07-10 NOTE — H&P
No significant change in general health status based on examination of the patient, review of Nursing Admission Database and prenatal record.  Pt presented w/ SROM at 2:00 AM this morning, confirmed by ROM Plus after initial Fern Neg but she kept leaking so ROM Plus was done.    Dnay Valenzuela MD

## 2022-07-11 PROCEDURE — 999N000079 HC STATISTIC IP LACTATION SERVICES 1-15 MIN

## 2022-07-11 PROCEDURE — 120N000001 HC R&B MED SURG/OB

## 2022-07-11 PROCEDURE — 250N000013 HC RX MED GY IP 250 OP 250 PS 637: Performed by: OBSTETRICS & GYNECOLOGY

## 2022-07-11 RX ADMIN — IBUPROFEN 800 MG: 800 TABLET, FILM COATED ORAL at 22:58

## 2022-07-11 ASSESSMENT — ACTIVITIES OF DAILY LIVING (ADL)
ADLS_ACUITY_SCORE: 18

## 2022-07-11 NOTE — PLAN OF CARE
Patient meeting expected goals. Bonding well with . Declines pain meds this shift. Education taught to patient and patient verbalized understanding. Patient requesting evening discharge.

## 2022-07-11 NOTE — LACTATION NOTE
Lactation in to see patient. Baby at breast at this time. Assisted with getting infant deeper on to breast, and encouraged Allison to keep baby closer to breast for good stimulation, and for nipple tenderness. Previous baby is 19 years old. Encouraged feeding every 2-3 hours for stimulation. Discussed nipple discomfort. Using mother love cream.

## 2022-07-11 NOTE — PROGRESS NOTES
[facility] Public Health Nurse stopped in to visit with patient to discuss public health programs and services.  Patient was unavailable at the time of visit.

## 2022-07-11 NOTE — PROGRESS NOTES
[facility] Obstetrics Post-Partum Progress Note          Assessment and Plan:    Assessment:   Post-partum day #1  Normal spontaneous vaginal delivery pt delivered at 15:07   7/10/22  L&D complications: None      Doing well.  No immediate surgical complications identified.  No excessive bleeding  Pain well-controlled.  I discussed with her discharge later this afternoon versus tomorrow morning.  The patient states that there are some issues that the pediatricians want to clarify with her baby first and hence at this point she is tentatively planning discharge tomorrow.  She will let us know if her wishes change.  We discussed the standard postpartum hospitalization      Plan:   Ambulation encouraged  Breast feeding strategies discussed  Monitor wound for signs of infection  Pain control measures as needed  Reportable signs and symptoms dicussed with the patient  Anticipate discharge later today or tomorrow           Interval History:   Doing well.  Pain is well-controlled.  No fevers.  No history of foul-smelling vaginal discharge.  Good appetite.  Denies chest pain, shortness of breath, nausea or vomiting.  Vaginal bleeding is similar to a heavy menstrual flow.  Ambulatory.  Breastfeeding well.          Significant Problems:      Past Medical History:   Diagnosis Date     Ovarian cyst              Review of Systems:    The patient denies any chest pain, shortness of breath, excessive pain, fever, chills, purulent drainage from the wound, nausea or vomiting.          Medications:     All medications related to the patient's surgery have been reviewed  Current Facility-Administered Medications   Medication     acetaminophen (TYLENOL) tablet 650 mg     benzocaine (AMERICAINE) 20 % topical spray     bisacodyl (DULCOLAX) suppository 10 mg     carboprost (HEMABATE) injection 250 mcg     docusate sodium (COLACE) capsule 100 mg     hydrocortisone (Perianal) (ANUSOL-HC) 2.5 % cream     ibuprofen  (ADVIL/MOTRIN) tablet 800 mg     lanolin cream     methylergonovine (METHERGINE) injection 200 mcg     misoprostol (CYTOTEC) tablet 400 mcg    Or     misoprostol (CYTOTEC) tablet 800 mcg     No MMR Needed - Assessment: Patient does not need MMR vaccine     No Tdap Needed - Assessment: Patient does not need Tdap vaccine     oxytocin (PITOCIN) 30 units in 500 mL 0.9% NaCl infusion     oxytocin (PITOCIN) injection 10 Units     tranexamic acid 1 g in 100 mL NS IV bag (premix)             Physical Exam:   Vitals were reviewed  All vitals stable  Temp: 97.7  F (36.5  C) Temp src: Oral BP: 101/44 Pulse: 67   Resp: 16   O2 Device: None (Room air)    Uterine fundus is firm, non-tender and at the level of the umbilicus          Data:     All laboratory data related to this surgery reviewed  Hemoglobin   Date Value Ref Range Status   07/10/2022 11.9 11.7 - 15.7 g/dL Final   07/05/2022 12.0 11.7 - 15.7 g/dL Final   06/27/2022 12.2 11.7 - 15.7 g/dL Final   06/20/2022 12.0 11.7 - 15.7 g/dL Final   06/13/2022 11.9 11.7 - 15.7 g/dL Final   09/28/2015 12.8 11.7 - 15.7 g/dL Final     No imaging studies have been ordered    Vel Ott MD

## 2022-07-11 NOTE — PLAN OF CARE
Goals met: vitally stable, fundal checks, pain medications given with pain reassessment 1 hour after, PIV saline locked, voiding, no nausea, ambulating independently, bonding/cares for baby, education completed with all cares    Work in progress: Practicing breast feeding Q2-3hr

## 2022-07-11 NOTE — PLAN OF CARE
Assumed care of patient from [id number]. Oriented to room and plan of care. Pt declined pain medications. No further needs at this time.

## 2022-07-11 NOTE — PLAN OF CARE
Up independent in room, voiding without difficultly. Declines pain medications. Breastfeeding well. Bonding well with infant. FOB supportive and involved in cares.

## 2022-07-12 VITALS
TEMPERATURE: 97.8 F | WEIGHT: 176 LBS | BODY MASS INDEX: 33.23 KG/M2 | DIASTOLIC BLOOD PRESSURE: 55 MMHG | SYSTOLIC BLOOD PRESSURE: 119 MMHG | HEIGHT: 61 IN | RESPIRATION RATE: 16 BRPM | HEART RATE: 81 BPM

## 2022-07-12 ASSESSMENT — ACTIVITIES OF DAILY LIVING (ADL)
DEPENDENT_IADLS:: INDEPENDENT
ADLS_ACUITY_SCORE: 18

## 2022-07-12 NOTE — PLAN OF CARE
Vital signs stable. Postpartum assessment WDL. Pain well controlled. Up ad/carlyle. Voiding w/o difficulty. Tolerating a regular diet. Breastfeeding, sore nipples using Mothers Love nipple cream. Bonding well with . Pt received complete discharge paperwork and home medications. Pt received a breast pump for home. Discharge outcomes on care plan met. Pt states understanding and comfort with self care and follow up care. Pt had no further questions at the time of discharge and no unmet needs were identified. ID bands double checked and verified with parents and . Electronic security band removed from . Pt discharged on 2022 in the afternoon.

## 2022-07-12 NOTE — DISCHARGE SUMMARY
VAGINAL DELIVERY DISCHARGE SUMMARY    Admit date: 7/10/2022  Discharge date: 2022     Admit Dx:   42 year old  at 38w0d    Encounter for triage in pregnant patient [Z36.89]  Indication for care in labor or delivery [O75.9]     Discharge Dx:  - Same as above, s/p     Procedures:  - Spontaneous vaginal delivery    Admit HPI:  No significant change in general health status based on examination of the patient, review of Nursing Admission Database and prenatal record.  Pt presented w/ [facility]OM at 2:00 AM this morning, confirmed by ROM Plus after initial Fern Neg but she kept leaking so ROM Plus was done.     Please see her admit H&P for full details of her PMH, PSH, Meds, Allergies and exam on admit.    Hospital course:  Allison Henao, a 42 year old  female delivered a viable infant with apgars of 9  and 9 . Patient was fully dilated and pushing after 3  hours 3  minutes in active labor. Delivery was via vaginal, spontaneous  to a sterile field under none  anesthesia. Infant delivered in vertex  left  occiput  anterior  position. Anterior and posterior shoulders delivered without difficulty. The cord was clamped, cut twice and 3 vessels  were noted. Cord blood was obtained in routine fashion with the following disposition: lab .       Cord complications: none   Placenta delivered at 7/10/2022  3:13 PM . Placental disposition was Hospital disposal . Fundal massage performed and fundus found to be firm.      Episiotomy: none    Perineum, vagina, cervix were inspected, and the following lacerations were noted:   Perineal lacerations: none                 No lacerations were sustained.     Excellent hemostasis was noted. Needle count correct. Infant and patient in delivery room in good and stable condition.      Please see her Delivery Summary for full details regarding her delivery.    Her postpartum course was complicated by nothing. By the time of discharge, she was meeting all of her postpartum goals  and deemed stable for discharge. She was voiding without difficulty, tolerating a regular diet without nausea and vomiting, her pain was well controlled on oral pain medicines and her lochia was appropriate. She was hemodynamically stable.     Physical exam on the day of discharge:  Vitals:    07/11/22 1300 07/11/22 2215 07/12/22 0322 07/12/22 0855   BP: 107/60 110/50 104/53 119/55   BP Location: Right arm Left arm Left arm Left arm   Patient Position:  Semi-Quick's Semi-Quick's Semi-Quick's   Cuff Size:  Adult Regular Adult Regular Adult Regular   Pulse: 70 69 59 81   Resp: 18 18 16 16   Temp: 98  F (36.7  C) 98.2  F (36.8  C) 97.7  F (36.5  C) 97.8  F (36.6  C)   TempSrc: Oral Oral Oral Oral   Weight:       Height:           General: sitting up, alert and cooperative  Abd: soft, non-distended, non-tender. Fundus firm, nontender, 2 cm below umbilicus.   Extremities: calves nontender, trace edema of lower extremities bilaterally    Lab Results   Component Value Date    HGB 11.9 07/10/2022    HGB 12.0 07/05/2022    HGB 12.8 09/28/2015     Blood type: No results found for: RH    Discharge/Disposition:  Ms. Allison Henao was discharged to home in stable condition with the following instructions/medications:  1) Call for temperature > 100.4, foul smelling vaginal discharge, bleeding > 1 pad per hour x 2 hrs, pain not controlled by oral pain meds, thoughts of self-harm or harming the infant.  2) Contraception counseling was provided.  3) She was instructed to follow-up with her primary OB in 6 weeks for a routine postpartum visit, and in 1 week for a blood pressure check if having any blood pressure issues while hospitalized.  4) She was instructed to continue her PNV on discharge if she wished to breast feed her infant.  5) She was discharged home with the following medications:    Current Discharge Medication List      CONTINUE these medications which have NOT CHANGED    Details   cyanocobalamin (VITAMIN B-12)  1000 MCG tablet Take 1 tablet (1,000 mcg) by mouth daily  Qty: 30 tablet, Refills: 3    Associated Diagnoses: Vitamin B12 deficiency (non anemic)      ferrous sulfate 140 (45 Fe) MG TBCR CR tablet Take 140 mg by mouth daily      Prenatal Vit-Fe Fumarate-FA (PRENATAL VITAMIN) 27-0.8 MG TABS Take 1 tablet by mouth daily  Qty: 90 tablet, Refills: 3    Associated Diagnoses: Multigravida of advanced maternal age in second trimester              Teressa Modi MD, MPH  [facility]

## 2022-07-12 NOTE — PLAN OF CARE
VSS. Up ad carlyle. Voiding without difficulty. Lochia scant, no clots. Fundus firm and midline. Pain well managed with Ibuprofen. Breastfeeding, tolerating well. FOB supportive and at bedside. Bonding well with infant.

## 2022-07-13 ENCOUNTER — PATIENT OUTREACH (OUTPATIENT)
Dept: CARE COORDINATION | Facility: CLINIC | Age: 42
End: 2022-07-13

## 2022-07-13 DIAGNOSIS — Z71.89 OTHER SPECIFIED COUNSELING: ICD-10-CM

## 2022-07-13 NOTE — PROGRESS NOTES
Clinic Care Coordination Contact  [facility]/Voicemail       Clinical Data: Care Coordinator Outreach  Outreach attempted x 1.  Left message on patient's voicemail with call back information and requested return call.  Plan: Care Coordinator will try to reach patient again in 1-2 business days.    .Diana WHITE Community Health Worker  Clinic Care Coordination  [facility]  Phone: [phone]

## 2022-07-14 NOTE — PROGRESS NOTES
Clinic Care Coordination Contact  [facility]: Post-Discharge Note  SITUATION                                                      Admission:    Admission Date: 07/10/22   Reason for Admission:  at 38w0d  Discharge:   Discharge Date: 22  Discharge Diagnosis:  at 38w0d    BACKGROUND                                                      Per hospital discharge summary and inpatient provider notes:Allison Henao, a 42 year old  female delivered a viable infant with apgars of 9  and 9 . Patient was fully dilated and pushing after 3  hours 3  minutes in active labor. Delivery was via vaginal, spontaneous  to a sterile field under none  anesthesia. Infant delivered in vertex  left  occiput  anterior  position. Anterior and posterior shoulders delivered without difficulty. The cord was clamped, cut twice and 3 vessels  were noted. Cord blood was obtained in routine fashion with the following disposition: lab .       Cord complications: none   Placenta delivered at 7/10/2022  3:13 PM . Placental disposition was Hospital disposal . Fundal massage performed and fundus found to be firm.      Episiotomy: none    Perineum, vagina, cervix were inspected, and the following lacerations were noted:   Perineal lacerations: none                 No lacerations were sustained.     Excellent hemostasis was noted. Needle count correct. Infant and patient in delivery room in good and stable condition.       Please see her Delivery Summary for full details regarding her delivery.     Her postpartum course was complicated by nothing. By the time of discharge, she was meeting all of her postpartum goals and deemed stable for discharge. She was voiding without difficulty, tolerating a regular diet without nausea and vomiting, her pain was well controlled on oral pain medicines and her lochia was appropriate. She was hemodynamically stable.          ASSESSMENT      Enrollment  Primary Care Care Coordination  "Status: Unable to Reach    Discharge Assessment  How are you doing now that you are home?: \" I am feeling fine \"  How are your symptoms? (Red Flag symptoms escalate to triage hotline per guidelines): Improved  Do you feel your condition is stable enough to be safe at home until your provider visit?: Yes  Does the patient have their discharge instructions? : Yes  Does the patient have questions regarding their discharge instructions? : No  Were you started on any new medications or were there changes to any of your previous medications? : No  Does the patient have all of their medications?: Yes  Do you have questions regarding any of your medications? : No  Do you have all of your needed medical supplies or equipment (DME)?  (i.e. oxygen tank, CPAP, cane, etc.): Yes  Discharge follow-up appointment scheduled within 14 calendar days? : No    Post-op ([facility] Only)  If the patient had a surgery or procedure, do they have any questions for a nurse?: No           PLAN                                                      Outpatient Plan: Follow up with provider in 2 weeks and 6 weeks for post-delivery checks     Future Appointments   Date Time Provider Department Center   7/18/2022  3:45 PM [facility]   7/18/2022  4:15 PM Vel Ott MD [facility]   8/8/2022  8:30 AM [facility]         For any urgent concerns, please contact our 24 hour nurse triage line: [phone] ([phone])         Yaz Duncan                "

## 2022-07-22 ENCOUNTER — LAB (OUTPATIENT)
Dept: LAB | Facility: CLINIC | Age: 42
End: 2022-07-22
Payer: COMMERCIAL

## 2022-07-22 DIAGNOSIS — D69.6 THROMBOCYTOPENIA AFFECTING PREGNANCY (H): ICD-10-CM

## 2022-07-22 DIAGNOSIS — O99.119 THROMBOCYTOPENIA AFFECTING PREGNANCY (H): ICD-10-CM

## 2022-07-22 LAB
BASOPHILS # BLD AUTO: 0 10E3/UL (ref 0–0.2)
BASOPHILS NFR BLD AUTO: 1 %
EOSINOPHIL # BLD AUTO: 0.1 10E3/UL (ref 0–0.7)
EOSINOPHIL NFR BLD AUTO: 2 %
ERYTHROCYTE [DISTWIDTH] IN BLOOD BY AUTOMATED COUNT: 12.7 % (ref 10–15)
HCT VFR BLD AUTO: 41 % (ref 35–47)
HGB BLD-MCNC: 13.9 G/DL (ref 11.7–15.7)
IMM GRANULOCYTES # BLD: 0 10E3/UL
IMM GRANULOCYTES NFR BLD: 0 %
LDH SERPL L TO P-CCNC: 220 U/L (ref 81–234)
LYMPHOCYTES # BLD AUTO: 1.4 10E3/UL (ref 0.8–5.3)
LYMPHOCYTES NFR BLD AUTO: 26 %
MCH RBC QN AUTO: 29.7 PG (ref 26.5–33)
MCHC RBC AUTO-ENTMCNC: 33.9 G/DL (ref 31.5–36.5)
MCV RBC AUTO: 88 FL (ref 78–100)
MONOCYTES # BLD AUTO: 0.3 10E3/UL (ref 0–1.3)
MONOCYTES NFR BLD AUTO: 6 %
NEUTROPHILS # BLD AUTO: 3.5 10E3/UL (ref 1.6–8.3)
NEUTROPHILS NFR BLD AUTO: 65 %
PLATELET # BLD AUTO: 150 10E3/UL (ref 150–450)
RBC # BLD AUTO: 4.68 10E6/UL (ref 3.8–5.2)
RETICS # AUTO: 0.07 10E6/UL (ref 0.03–0.1)
RETICS/RBC NFR AUTO: 1.4 % (ref 0.5–2)
WBC # BLD AUTO: 5.4 10E3/UL (ref 4–11)

## 2022-07-22 PROCEDURE — 85384 FIBRINOGEN ACTIVITY: CPT

## 2022-07-22 PROCEDURE — 85025 COMPLETE CBC W/AUTO DIFF WBC: CPT

## 2022-07-22 PROCEDURE — 85045 AUTOMATED RETICULOCYTE COUNT: CPT

## 2022-07-22 PROCEDURE — 80053 COMPREHEN METABOLIC PANEL: CPT

## 2022-07-22 PROCEDURE — 85610 PROTHROMBIN TIME: CPT

## 2022-07-22 PROCEDURE — 83010 ASSAY OF HAPTOGLOBIN QUANT: CPT

## 2022-07-22 PROCEDURE — 36415 COLL VENOUS BLD VENIPUNCTURE: CPT

## 2022-07-22 PROCEDURE — 85730 THROMBOPLASTIN TIME PARTIAL: CPT

## 2022-07-22 PROCEDURE — 83615 LACTATE (LD) (LDH) ENZYME: CPT

## 2022-07-23 LAB
ALBUMIN SERPL-MCNC: 3 G/DL (ref 3.4–5)
ALP SERPL-CCNC: 131 U/L (ref 40–150)
ALT SERPL W P-5'-P-CCNC: 63 U/L (ref 0–50)
ANION GAP SERPL CALCULATED.3IONS-SCNC: 6 MMOL/L (ref 3–14)
AST SERPL W P-5'-P-CCNC: 31 U/L (ref 0–45)
BILIRUB SERPL-MCNC: 0.3 MG/DL (ref 0.2–1.3)
BUN SERPL-MCNC: 16 MG/DL (ref 7–30)
CALCIUM SERPL-MCNC: 8.6 MG/DL (ref 8.5–10.1)
CHLORIDE BLD-SCNC: 110 MMOL/L (ref 94–109)
CO2 SERPL-SCNC: 25 MMOL/L (ref 20–32)
CREAT SERPL-MCNC: 0.63 MG/DL (ref 0.52–1.04)
GFR SERPL CREATININE-BSD FRML MDRD: >90 ML/MIN/1.73M2
GLUCOSE BLD-MCNC: 117 MG/DL (ref 70–99)
POTASSIUM BLD-SCNC: 3.2 MMOL/L (ref 3.4–5.3)
PROT SERPL-MCNC: 6.6 G/DL (ref 6.8–8.8)
SODIUM SERPL-SCNC: 141 MMOL/L (ref 133–144)

## 2022-07-25 LAB
APTT PPP: 38 SECONDS (ref 22–38)
FIBRINOGEN PPP-MCNC: 156 MG/DL (ref 170–490)
HAPTOGLOB SERPL-MCNC: 246 MG/DL (ref 32–197)
INR PPP: 0.97 (ref 0.85–1.15)

## 2022-07-25 PROCEDURE — 85060 BLOOD SMEAR INTERPRETATION: CPT | Performed by: PATHOLOGY

## 2022-07-26 ENCOUNTER — PATIENT OUTREACH (OUTPATIENT)
Dept: ONCOLOGY | Facility: CLINIC | Age: 42
End: 2022-07-26

## 2022-07-26 NOTE — PROGRESS NOTES
Per Dr. Roque:     Allison's platelets are perfect at 150K. She is okay to keep her appointment as scheduled, but you can offer her the option to cancel if she feels comfortable with this. She should continue her yearly physical examinations with her PCP. No need for hematology clinic follow up unless she has evidence of low counts again in the future.      Per Pablito BRUNILDA:   Could you let her know that she needs to add potassium to her diet? Otherwise labs are fine.       Writer contacted Allison with the assistance of a  and discussed Dr. Roque's and Pablito' recommendations. Allison reports that she is comfortable cancelling her upcoming appointment with Dr. Roque and will follow up with her PCP. Writer also suggested potassium rich foods that Allison can add to her diet. She is agreeable.    During call, Allison requested more information on prescribed Vitamin B12 and Iron supplements and if they were safe for breastfeeding. Writer spoke to Hernandez, pharmacist, to discuss recommendations and he states that they are both safe for mom and baby.     Allison to follow up at a PRN basis in the future.    Dot Villanueva RN on 7/26/2022 at 10:26 AM

## 2022-08-12 ENCOUNTER — TELEPHONE (OUTPATIENT)
Dept: ONCOLOGY | Facility: CLINIC | Age: 42
End: 2022-08-12

## 2022-08-12 ENCOUNTER — MEDICAL CORRESPONDENCE (OUTPATIENT)
Dept: OBGYN | Facility: CLINIC | Age: 42
End: 2022-08-12

## 2022-08-23 ENCOUNTER — OFFICE VISIT (OUTPATIENT)
Dept: OBGYN | Facility: CLINIC | Age: 42
End: 2022-08-23
Payer: COMMERCIAL

## 2022-08-23 VITALS — BODY MASS INDEX: 28.29 KG/M2 | SYSTOLIC BLOOD PRESSURE: 102 MMHG | WEIGHT: 149.7 LBS | DIASTOLIC BLOOD PRESSURE: 70 MMHG

## 2022-08-23 PROCEDURE — 99207 PR POST PARTUM EXAM: CPT | Performed by: OBSTETRICS & GYNECOLOGY

## 2022-08-23 ASSESSMENT — PATIENT HEALTH QUESTIONNAIRE - PHQ9: SUM OF ALL RESPONSES TO PHQ QUESTIONS 1-9: 0

## 2022-08-23 NOTE — PROGRESS NOTES
"SUBJECTIVE:  Allison Henao,  is here for a postpartum visit.  She had a  on 7/10 delivering a healthy baby girl, named Candy weighing 7 lbs 10 oz at term.      HPI:  Pregnancy was complicated by thrombocytopenia.  Hematology has signed off after PP level 150K.  Did not rquire treatment.    Has noticed a \"bump\" on left abdominal wall that comes and goes.  ?hernia vs muscle spasm.  Will watch and re present id persists or worsens as I cannot feel anything abnormal on exam today.    Delivery complications:  No  Breast feeding:  Yes, going well  Bladder problems:  No  Bowel problems/hemorrhoids:  No  Episiotomy/laceration/incision healed? N/A  Vaginal flow:  None  [place]:  No  Contraception: unsure  Emotional adjustment:  doing well and happy  Back to work:  2023?    12 point review of systems negative other than symptoms noted below.    OBJECTIVE:  Vitals: LMP 10/17/2021   BMI= There is no height or weight on file to calculate BMI.  General - pleasant female in no acute distress.  Breast -  deferred  Abdomen - No incision  Pelvic - EG: normal adult female, BUS: within normal limits, Vagina: well rugated, no discharge, Cervix: no lesions or CMT, Uterus: firm, normal sized and nontender, anteverted in position. Adnexae: no masses or tenderness.  Rectovaginal - deferred.    ASSESSMENT:    ICD-10-CM    1. Routine postpartum follow-up  Z39.2        PLAN:  May resume normal activities without restrictions.  Pap smear was not done today.    Full counseling was provided, and all questions were answered.   Return to clinic in one year for an annual visit.   RTC for contraception if desired    Tyrone Harvey MD      "

## 2022-08-23 NOTE — NURSING NOTE
"Chief Complaint   Patient presents with     Postpartum Care   Video      initial /70   Wt 67.9 kg (149 lb 11.2 oz)   Breastfeeding Yes   BMI 28.29 kg/m   Estimated body mass index is 28.29 kg/m  as calculated from the following:    Height as of 7/10/22: 1.549 m (5' 1\").    Weight as of this encounter: 67.9 kg (149 lb 11.2 oz).  BP completed using cuff size regular.  Fadia Carroll CMA    "

## 2022-08-24 PROBLEM — R10.2 PAIN OF PELVIC GIRDLE: Status: RESOLVED | Noted: 2022-07-01 | Resolved: 2022-08-24

## 2022-09-03 ENCOUNTER — HEALTH MAINTENANCE LETTER (OUTPATIENT)
Age: 42
End: 2022-09-03

## 2022-09-12 ENCOUNTER — TELEPHONE (OUTPATIENT)
Dept: ONCOLOGY | Facility: CLINIC | Age: 42
End: 2022-09-12

## 2022-09-12 NOTE — TELEPHONE ENCOUNTER
Put PTs [place] post alexandrea depression scale results in chart from daughters appt on 22.    Lacy Polanco MA

## 2022-09-20 ENCOUNTER — HOSPITAL ENCOUNTER (EMERGENCY)
Facility: CLINIC | Age: 42
Discharge: HOME | End: 2022-09-20
Attending: EMERGENCY MEDICINE | Admitting: EMERGENCY MEDICINE
Payer: COMMERCIAL

## 2022-09-20 ENCOUNTER — APPOINTMENT (OUTPATIENT)
Dept: CT IMAGING | Facility: CLINIC | Age: 42
End: 2022-09-20
Attending: EMERGENCY MEDICINE
Payer: COMMERCIAL

## 2022-09-20 VITALS
RESPIRATION RATE: 18 BRPM | TEMPERATURE: 98.6 F | SYSTOLIC BLOOD PRESSURE: 140 MMHG | OXYGEN SATURATION: 99 % | HEART RATE: 77 BPM | DIASTOLIC BLOOD PRESSURE: 75 MMHG

## 2022-09-20 DIAGNOSIS — J18.9 PNEUMONIA OF RIGHT LOWER LOBE DUE TO INFECTIOUS ORGANISM: ICD-10-CM

## 2022-09-20 DIAGNOSIS — M54.50 ACUTE LEFT-SIDED LOW BACK PAIN WITHOUT SCIATICA: ICD-10-CM

## 2022-09-20 LAB
ALBUMIN UR-MCNC: NEGATIVE MG/DL
ANION GAP SERPL CALCULATED.3IONS-SCNC: 12 MMOL/L (ref 7–15)
APPEARANCE UR: CLEAR
BASOPHILS # BLD AUTO: 0 10E3/UL (ref 0–0.2)
BASOPHILS NFR BLD AUTO: 0 %
BILIRUB UR QL STRIP: NEGATIVE
BUN SERPL-MCNC: 19 MG/DL (ref 6–20)
CALCIUM SERPL-MCNC: 9.5 MG/DL (ref 8.6–10)
CHLORIDE SERPL-SCNC: 102 MMOL/L (ref 98–107)
COLOR UR AUTO: ABNORMAL
CREAT SERPL-MCNC: 0.53 MG/DL (ref 0.51–0.95)
DEPRECATED HCO3 PLAS-SCNC: 25 MMOL/L (ref 22–29)
EOSINOPHIL # BLD AUTO: 0.1 10E3/UL (ref 0–0.7)
EOSINOPHIL NFR BLD AUTO: 1 %
ERYTHROCYTE [DISTWIDTH] IN BLOOD BY AUTOMATED COUNT: 12.7 % (ref 10–15)
GFR SERPL CREATININE-BSD FRML MDRD: >90 ML/MIN/1.73M2
GLUCOSE SERPL-MCNC: 110 MG/DL (ref 70–99)
GLUCOSE UR STRIP-MCNC: NEGATIVE MG/DL
HCG UR QL: NEGATIVE
HCT VFR BLD AUTO: 42.8 % (ref 35–47)
HGB BLD-MCNC: 14 G/DL (ref 11.7–15.7)
HGB UR QL STRIP: ABNORMAL
HOLD SPECIMEN: NORMAL
HOLD SPECIMEN: NORMAL
IMM GRANULOCYTES # BLD: 0 10E3/UL
IMM GRANULOCYTES NFR BLD: 0 %
KETONES UR STRIP-MCNC: NEGATIVE MG/DL
LEUKOCYTE ESTERASE UR QL STRIP: ABNORMAL
LYMPHOCYTES # BLD AUTO: 2 10E3/UL (ref 0.8–5.3)
LYMPHOCYTES NFR BLD AUTO: 21 %
MCH RBC QN AUTO: 28.9 PG (ref 26.5–33)
MCHC RBC AUTO-ENTMCNC: 32.7 G/DL (ref 31.5–36.5)
MCV RBC AUTO: 88 FL (ref 78–100)
MONOCYTES # BLD AUTO: 0.4 10E3/UL (ref 0–1.3)
MONOCYTES NFR BLD AUTO: 4 %
MUCOUS THREADS #/AREA URNS LPF: PRESENT /LPF
NEUTROPHILS # BLD AUTO: 7.3 10E3/UL (ref 1.6–8.3)
NEUTROPHILS NFR BLD AUTO: 74 %
NITRATE UR QL: NEGATIVE
NRBC # BLD AUTO: 0 10E3/UL
NRBC BLD AUTO-RTO: 0 /100
PH UR STRIP: 6.5 [PH] (ref 5–7)
PLAT MORPH BLD: ABNORMAL
PLATELET # BLD AUTO: 168 10E3/UL (ref 150–450)
POTASSIUM SERPL-SCNC: 4.6 MMOL/L (ref 3.4–5.3)
RBC # BLD AUTO: 4.84 10E6/UL (ref 3.8–5.2)
RBC MORPH BLD: ABNORMAL
RBC URINE: 9 /HPF
SODIUM SERPL-SCNC: 139 MMOL/L (ref 136–145)
SP GR UR STRIP: 1.02 (ref 1–1.03)
SQUAMOUS EPITHELIAL: 1 /HPF
UROBILINOGEN UR STRIP-MCNC: NORMAL MG/DL
WBC # BLD AUTO: 10 10E3/UL (ref 4–11)
WBC URINE: 2 /HPF

## 2022-09-20 PROCEDURE — 36415 COLL VENOUS BLD VENIPUNCTURE: CPT | Performed by: EMERGENCY MEDICINE

## 2022-09-20 PROCEDURE — 250N000011 HC RX IP 250 OP 636: Performed by: EMERGENCY MEDICINE

## 2022-09-20 PROCEDURE — 99284 EMERGENCY DEPT VISIT MOD MDM: CPT | Mod: 25

## 2022-09-20 PROCEDURE — 74176 CT ABD & PELVIS W/O CONTRAST: CPT

## 2022-09-20 PROCEDURE — 81003 URINALYSIS AUTO W/O SCOPE: CPT | Performed by: EMERGENCY MEDICINE

## 2022-09-20 PROCEDURE — 82310 ASSAY OF CALCIUM: CPT | Performed by: EMERGENCY MEDICINE

## 2022-09-20 PROCEDURE — 81025 URINE PREGNANCY TEST: CPT | Performed by: EMERGENCY MEDICINE

## 2022-09-20 PROCEDURE — 85025 COMPLETE CBC W/AUTO DIFF WBC: CPT | Performed by: EMERGENCY MEDICINE

## 2022-09-20 RX ORDER — AZITHROMYCIN 250 MG/1
TABLET, FILM COATED ORAL
Qty: 6 TABLET | Refills: 0 | Status: SHIPPED | OUTPATIENT
Start: 2022-09-20 | End: 2022-09-25

## 2022-09-20 RX ORDER — ONDANSETRON 4 MG/1
4 TABLET, ORALLY DISINTEGRATING ORAL ONCE
Status: COMPLETED | OUTPATIENT
Start: 2022-09-20 | End: 2022-09-20

## 2022-09-20 RX ADMIN — ONDANSETRON 4 MG: 4 TABLET, ORALLY DISINTEGRATING ORAL at 01:09

## 2022-09-20 ASSESSMENT — ENCOUNTER SYMPTOMS
BACK PAIN: 1
VOMITING: 1
NAUSEA: 1
FEVER: 0

## 2022-09-20 ASSESSMENT — ACTIVITIES OF DAILY LIVING (ADL): ADLS_ACUITY_SCORE: 35

## 2022-09-20 NOTE — LETTER
09/20/22      To Whom it may concern:    Juan was in our Emergency Department today, 09/20/22. with a patient who needed their assistance.  Please excuse them from work/school.      Sincerely,          Floresita Jones RN

## 2022-09-20 NOTE — ED PROVIDER NOTES
History     Chief Complaint:  Back Pain    HPI   Allison Henao is a 42 year old female who presents with back pain. The patient reports sudden onset of left sided back pain this evening while sitting down. The pain is not worse with movement and it does not radiate into her legs. She vomited today. She denies any fever. She has not taken anything for the pain. She was given Zofran in the ED and was feeling better.     Review of Systems   Constitutional: Negative for fever.   Gastrointestinal: Positive for nausea and vomiting.   Musculoskeletal: Positive for back pain.   All other systems reviewed and are negative.    Allergies:  The patient has no known allergies.     Medications:  Cyanocobalamin   Ferrous sulfate  Prenatal vitamins    Past Medical History:     Ovarian cyst    Past Surgical History:    Oophorectomy and salpingectomy    Family History:    Mother: thyroid disease, pacemaker  Father: pacemaker    Social History:  The patient presents to the ED with her   PCP: Zari Roque     Physical Exam     Patient Vitals for the past 24 hrs:   BP Temp Temp src Pulse Resp SpO2   09/20/22 0055 140/75 98.6  F (37  C) Temporal 77 18 99 %     Physical Exam  Nursing note and vitals reviewed.  Constitutional: Cooperative.   HENT:   Mouth/Throat: Mucous membranes are normal.   Cardiovascular: Normal rate, regular rhythm and normal heart sounds.  No murmur.  Pulmonary/Chest: Effort normal and breath sounds normal. No respiratory distress. No wheezes.   Abdominal: Soft. Normal appearance and bowel sounds are normal. No distension. There is no tenderness. There is no rigidity and no guarding.   Musculoskeletal: Normal range of motion of LE's.   Neurological: Alert. Oriented x4  Skin: Skin is warm and dry. No rash noted.   Psychiatric: Normal mood and affect.     Emergency Department Course   Imaging:  CT Abdomen Pelvis without Contrast (stone protocol)   Final Result   IMPRESSION:    1.  Ground glass opacities  with tree-in-bud interstitial patients in the right lower lobe concerning for multifocal pneumonia with endobronchial spread.   2.  3 mm nonobstructive right lower pole collecting system calculi   3.  Normal appendix   4.  Stool seen throughout the colon, correlate for constipation   5.  Hepatomegaly         Report per radiology    Laboratory:  Labs Ordered and Resulted from Time of ED Arrival to Time of ED Departure   ROUTINE UA WITH MICROSCOPIC REFLEX TO CULTURE - Abnormal       Result Value    Color Urine Light Yellow      Appearance Urine Clear      Glucose Urine Negative      Bilirubin Urine Negative      Ketones Urine Negative      Specific Gravity Urine 1.022      Blood Urine Trace (*)     pH Urine 6.5      Protein Albumin Urine Negative      Urobilinogen Urine Normal      Nitrite Urine Negative      Leukocyte Esterase Urine Trace (*)     Mucus Urine Present (*)     RBC Urine 9 (*)     WBC Urine 2      Squamous Epithelials Urine 1     BASIC METABOLIC PANEL - Abnormal    Sodium 139      Potassium 4.6      Chloride 102      Carbon Dioxide (CO2) 25      Anion Gap 12      Urea Nitrogen 19.0      Creatinine 0.53      Calcium 9.5      Glucose 110 (*)     GFR Estimate >90     RBC AND PLATELET MORPHOLOGY - Abnormal    Platelet Assessment   (*)     Value: Automated Count Confirmed. Giant platelets are present.    RBC Morphology Confirmed RBC Indices     HCG QUALITATIVE URINE - Normal    hCG Urine Qualitative Negative     CBC WITH PLATELETS AND DIFFERENTIAL    WBC Count 10.0      RBC Count 4.84      Hemoglobin 14.0      Hematocrit 42.8      MCV 88      MCH 28.9      MCHC 32.7      RDW 12.7      Platelet Count 168      % Neutrophils 74      % Lymphocytes 21      % Monocytes 4      % Eosinophils 1      % Basophils 0      % Immature Granulocytes 0      NRBCs per 100 WBC 0      Absolute Neutrophils 7.3      Absolute Lymphocytes 2.0      Absolute Monocytes 0.4      Absolute Eosinophils 0.1      Absolute Basophils 0.0       Absolute Immature Granulocytes 0.0      Absolute NRBCs 0.0        Reviewed:  I reviewed nursing notes, vitals, past medical history and Care Everywhere    Assessments:  0531 I obtained history and examined the patient as noted above.   0700 I rechecked the patient and explained findings. She notes she has had a productive cough over the past week.     Interventions:  109 Zofran, 4 mg, IV     Disposition:  The patient was discharged to home.     Impression & Plan     Medical Decision Makin yo F with initial complaint of back pain and left flank pain.  CT abdomen was negative, but did show probable RLL pneumonia.  On recheck, she does endorse a recent productive cough.  No fever.  She has had Covid prior.  With unilateral findings I favor bacterial pneumonia.  Will treat with PO antibiotics. Back pain likely musculoskeletal from coughing.  No hypoxia.  Unlikely to represent PE as her pain was left flank and pneumonia on the RLL.  Stable for discharge.     Diagnosis:    ICD-10-CM    1. Pneumonia of right lower lobe due to infectious organism  J18.9    2. Acute left-sided low back pain without sciatica  M54.50        Discharge Medications:  New Prescriptions    AZITHROMYCIN (ZITHROMAX Z-GRISEL) 250 MG TABLET    Two tablets on the first day, then one tablet daily for the next 4 days       Scribe Disclosure:  I, Cornell Strickland, am serving as a scribe at 5:25 AM on 2022 to document services personally performed by Jona Streeter MD based on my observations and the provider's statements to me.            Jona Streeter MD  [phone]

## 2022-09-20 NOTE — ED TRIAGE NOTES
Pt presents to ED for evaluation of sudden left lower back pain that started 1 hour prior to arrival. She states she sat down and the pain started. Pain is causing nausea. No meds PTA.      Triage Assessment     Row Name 09/20/22 0054       Triage Assessment (Adult)    Airway WDL WDL       Respiratory WDL    Respiratory WDL WDL       Skin Circulation/Temperature WDL    Skin Circulation/Temperature WDL WDL       Cardiac WDL    Cardiac WDL WDL       Peripheral/Neurovascular WDL    Peripheral Neurovascular WDL WDL       Cognitive/Neuro/Behavioral WDL    Cognitive/Neuro/Behavioral WDL WDL              
medication therapy

## 2022-11-11 ENCOUNTER — TELEPHONE (OUTPATIENT)
Dept: ONCOLOGY | Facility: CLINIC | Age: 42
End: 2022-11-11

## 2023-04-29 ENCOUNTER — HEALTH MAINTENANCE LETTER (OUTPATIENT)
Age: 43
End: 2023-04-29

## 2023-08-09 NOTE — TELEPHONE ENCOUNTER
Pt's spouse calling regarding forms.  Advised the provider has been out until yesterday.    Please fill out forms and advise when done.    Marcy Pedroza RN     [Negative] : Heme/Lymph

## 2024-02-17 ENCOUNTER — HEALTH MAINTENANCE LETTER (OUTPATIENT)
Age: 44
End: 2024-02-17

## 2024-07-06 ENCOUNTER — HEALTH MAINTENANCE LETTER (OUTPATIENT)
Age: 44
End: 2024-07-06

## 2025-07-13 ENCOUNTER — HEALTH MAINTENANCE LETTER (OUTPATIENT)
Age: 45
End: 2025-07-13